# Patient Record
Sex: FEMALE | Race: WHITE | NOT HISPANIC OR LATINO | URBAN - METROPOLITAN AREA
[De-identification: names, ages, dates, MRNs, and addresses within clinical notes are randomized per-mention and may not be internally consistent; named-entity substitution may affect disease eponyms.]

---

## 2020-07-10 ENCOUNTER — TELEPHONE ENCOUNTER (OUTPATIENT)
Dept: URBAN - METROPOLITAN AREA CLINIC 9 | Facility: CLINIC | Age: 35
End: 2020-07-10

## 2020-07-16 ENCOUNTER — TELEPHONE ENCOUNTER (OUTPATIENT)
Dept: URBAN - METROPOLITAN AREA CLINIC 9 | Facility: CLINIC | Age: 35
End: 2020-07-16

## 2020-07-30 ENCOUNTER — OFFICE VISIT (OUTPATIENT)
Dept: URBAN - METROPOLITAN AREA CLINIC 9 | Facility: CLINIC | Age: 35
End: 2020-07-30

## 2020-08-10 ENCOUNTER — LAB OUTSIDE AN ENCOUNTER (OUTPATIENT)
Age: 35
End: 2020-08-10

## 2020-08-10 ENCOUNTER — TELEPHONE ENCOUNTER (OUTPATIENT)
Dept: URBAN - METROPOLITAN AREA CLINIC 9 | Facility: CLINIC | Age: 35
End: 2020-08-10

## 2020-08-27 LAB — CALPROTECTIN, FECAL: (no result)

## 2020-08-31 ENCOUNTER — TELEPHONE ENCOUNTER (OUTPATIENT)
Dept: URBAN - METROPOLITAN AREA CLINIC 9 | Facility: CLINIC | Age: 35
End: 2020-08-31

## 2020-09-14 ENCOUNTER — TELEPHONE ENCOUNTER (OUTPATIENT)
Dept: URBAN - METROPOLITAN AREA CLINIC 9 | Facility: CLINIC | Age: 35
End: 2020-09-14

## 2020-09-24 ENCOUNTER — OFFICE VISIT (OUTPATIENT)
Dept: URBAN - METROPOLITAN AREA SURGERY CENTER 9 | Facility: SURGERY CENTER | Age: 35
End: 2020-09-24

## 2020-10-06 ENCOUNTER — TELEPHONE ENCOUNTER (OUTPATIENT)
Dept: URBAN - METROPOLITAN AREA CLINIC 9 | Facility: CLINIC | Age: 35
End: 2020-10-06

## 2020-10-09 ENCOUNTER — OFFICE VISIT (OUTPATIENT)
Dept: URBAN - METROPOLITAN AREA CLINIC 9 | Facility: CLINIC | Age: 35
End: 2020-10-09

## 2020-11-04 ENCOUNTER — LAB OUTSIDE AN ENCOUNTER (OUTPATIENT)
Age: 35
End: 2020-11-04

## 2020-12-28 ENCOUNTER — TELEPHONE ENCOUNTER (OUTPATIENT)
Dept: URBAN - METROPOLITAN AREA CLINIC 9 | Facility: CLINIC | Age: 35
End: 2020-12-28

## 2021-01-01 ENCOUNTER — OFFICE VISIT (OUTPATIENT)
Age: 36
End: 2021-01-01

## 2021-01-08 ENCOUNTER — TELEPHONE ENCOUNTER (OUTPATIENT)
Dept: URBAN - METROPOLITAN AREA CLINIC 9 | Facility: CLINIC | Age: 36
End: 2021-01-08

## 2021-02-01 ENCOUNTER — OFFICE VISIT (OUTPATIENT)
Age: 36
End: 2021-02-01

## 2021-02-10 ENCOUNTER — OFFICE VISIT (OUTPATIENT)
Dept: URBAN - METROPOLITAN AREA CLINIC 9 | Facility: CLINIC | Age: 36
End: 2021-02-10

## 2021-04-01 ENCOUNTER — OFFICE VISIT (OUTPATIENT)
Age: 36
End: 2021-04-01

## 2021-04-14 ENCOUNTER — TELEPHONE ENCOUNTER (OUTPATIENT)
Dept: URBAN - METROPOLITAN AREA CLINIC 9 | Facility: CLINIC | Age: 36
End: 2021-04-14

## 2021-04-14 ENCOUNTER — OFFICE VISIT (OUTPATIENT)
Dept: URBAN - METROPOLITAN AREA CLINIC 9 | Facility: CLINIC | Age: 36
End: 2021-04-14

## 2021-05-14 ENCOUNTER — OFFICE VISIT (OUTPATIENT)
Dept: URBAN - METROPOLITAN AREA CLINIC 9 | Facility: CLINIC | Age: 36
End: 2021-05-14

## 2021-05-25 ENCOUNTER — TELEPHONE ENCOUNTER (OUTPATIENT)
Dept: URBAN - METROPOLITAN AREA CLINIC 9 | Facility: CLINIC | Age: 36
End: 2021-05-25

## 2021-06-02 ENCOUNTER — TELEPHONE ENCOUNTER (OUTPATIENT)
Dept: URBAN - METROPOLITAN AREA CLINIC 9 | Facility: CLINIC | Age: 36
End: 2021-06-02

## 2021-06-02 ENCOUNTER — OFFICE VISIT (OUTPATIENT)
Dept: URBAN - METROPOLITAN AREA CLINIC 9 | Facility: CLINIC | Age: 36
End: 2021-06-02

## 2021-06-04 ENCOUNTER — OFFICE VISIT (OUTPATIENT)
Dept: URBAN - METROPOLITAN AREA SURGERY CENTER 9 | Facility: SURGERY CENTER | Age: 36
End: 2021-06-04

## 2021-06-07 ENCOUNTER — OFFICE VISIT (OUTPATIENT)
Dept: URBAN - METROPOLITAN AREA SURGERY CENTER 9 | Facility: SURGERY CENTER | Age: 36
End: 2021-06-07

## 2021-06-11 ENCOUNTER — TELEPHONE ENCOUNTER (OUTPATIENT)
Dept: URBAN - METROPOLITAN AREA CLINIC 9 | Facility: CLINIC | Age: 36
End: 2021-06-11

## 2021-06-14 ENCOUNTER — TELEPHONE ENCOUNTER (OUTPATIENT)
Dept: URBAN - METROPOLITAN AREA CLINIC 9 | Facility: CLINIC | Age: 36
End: 2021-06-14

## 2021-06-22 ENCOUNTER — TELEPHONE ENCOUNTER (OUTPATIENT)
Dept: URBAN - METROPOLITAN AREA CLINIC 9 | Facility: CLINIC | Age: 36
End: 2021-06-22

## 2021-06-22 ENCOUNTER — OFFICE VISIT (OUTPATIENT)
Dept: URBAN - METROPOLITAN AREA CLINIC 9 | Facility: CLINIC | Age: 36
End: 2021-06-22

## 2021-06-29 ENCOUNTER — TELEPHONE ENCOUNTER (OUTPATIENT)
Dept: URBAN - METROPOLITAN AREA CLINIC 9 | Facility: CLINIC | Age: 36
End: 2021-06-29

## 2021-07-27 ENCOUNTER — OFFICE VISIT (OUTPATIENT)
Dept: URBAN - METROPOLITAN AREA CLINIC 9 | Facility: CLINIC | Age: 36
End: 2021-07-27

## 2021-07-30 ENCOUNTER — OFFICE VISIT (OUTPATIENT)
Dept: URBAN - METROPOLITAN AREA CLINIC 9 | Facility: CLINIC | Age: 36
End: 2021-07-30

## 2021-09-22 ENCOUNTER — OFFICE VISIT (OUTPATIENT)
Dept: URBAN - METROPOLITAN AREA CLINIC 9 | Facility: CLINIC | Age: 36
End: 2021-09-22

## 2021-10-01 ENCOUNTER — OFFICE VISIT (OUTPATIENT)
Age: 36
End: 2021-10-01

## 2022-01-28 ENCOUNTER — OFFICE VISIT (OUTPATIENT)
Dept: URBAN - METROPOLITAN AREA CLINIC 9 | Facility: CLINIC | Age: 37
End: 2022-01-28

## 2022-02-03 ENCOUNTER — TELEPHONE ENCOUNTER (OUTPATIENT)
Dept: URBAN - METROPOLITAN AREA CLINIC 9 | Facility: CLINIC | Age: 37
End: 2022-02-03

## 2022-02-08 ENCOUNTER — TELEPHONE ENCOUNTER (OUTPATIENT)
Dept: URBAN - METROPOLITAN AREA CLINIC 9 | Facility: CLINIC | Age: 37
End: 2022-02-08

## 2022-02-17 ENCOUNTER — OFFICE VISIT (OUTPATIENT)
Dept: URBAN - METROPOLITAN AREA CLINIC 9 | Facility: CLINIC | Age: 37
End: 2022-02-17

## 2022-02-18 ENCOUNTER — OFFICE VISIT (OUTPATIENT)
Dept: URBAN - METROPOLITAN AREA CLINIC 9 | Facility: CLINIC | Age: 37
End: 2022-02-18

## 2022-03-02 ENCOUNTER — OFFICE VISIT (OUTPATIENT)
Dept: URBAN - METROPOLITAN AREA CLINIC 9 | Facility: CLINIC | Age: 37
End: 2022-03-02

## 2022-04-08 ENCOUNTER — OFFICE VISIT (OUTPATIENT)
Dept: URBAN - METROPOLITAN AREA CLINIC 9 | Facility: CLINIC | Age: 37
End: 2022-04-08

## 2022-04-28 ENCOUNTER — TELEPHONE ENCOUNTER (OUTPATIENT)
Dept: URBAN - METROPOLITAN AREA CLINIC 9 | Facility: CLINIC | Age: 37
End: 2022-04-28

## 2022-06-03 ENCOUNTER — TELEPHONE ENCOUNTER (OUTPATIENT)
Dept: URBAN - METROPOLITAN AREA CLINIC 9 | Facility: CLINIC | Age: 37
End: 2022-06-03

## 2022-06-03 ENCOUNTER — OFFICE VISIT (OUTPATIENT)
Dept: URBAN - METROPOLITAN AREA CLINIC 9 | Facility: CLINIC | Age: 37
End: 2022-06-03

## 2022-07-09 ENCOUNTER — TELEPHONE ENCOUNTER (OUTPATIENT)
Dept: URBAN - METROPOLITAN AREA CLINIC 121 | Facility: CLINIC | Age: 37
End: 2022-07-09

## 2022-07-09 RX ORDER — DROSPIRENONE AND ETHINYL ESTRADIOL 0.03MG-3MG
KIT ORAL ONCE A DAY
Refills: 0 | OUTPATIENT
Start: 2016-02-08 | End: 2019-02-20

## 2022-07-09 RX ORDER — DROSPIRENONE AND ETHINYL ESTRADIOL 0.03MG-3MG
KIT ORAL
Refills: 0 | OUTPATIENT
Start: 2015-12-31 | End: 2016-02-08

## 2022-07-09 RX ORDER — TOPIRAMATE 50 MG/1
TABLET ORAL ONCE A DAY
Refills: 0 | OUTPATIENT
Start: 2016-01-07 | End: 2016-02-08

## 2022-07-09 RX ORDER — SUCRALFATE 1 G/1
TABLET ORAL
Refills: 0 | OUTPATIENT
Start: 2019-02-20 | End: 2019-02-20

## 2022-07-09 RX ORDER — TOPIRAMATE 50 MG/1
TABLET ORAL ONCE A DAY
Refills: 0 | OUTPATIENT
Start: 2016-02-08 | End: 2019-02-20

## 2022-07-09 RX ORDER — FLUTICASONE PROPIONATE 50 UG/1
SPRAY, METERED NASAL
Refills: 0 | OUTPATIENT
Start: 2015-11-16 | End: 2016-02-08

## 2022-07-09 RX ORDER — FLUTICASONE PROPIONATE 50 UG/1
SPRAY, METERED NASAL ONCE A DAY
Refills: 0 | OUTPATIENT
Start: 2016-02-08 | End: 2019-02-20

## 2022-07-09 RX ORDER — OMEPRAZOLE 20 MG/1
ONCE A DAY CAPSULE, DELAYED RELEASE ORAL ONCE A DAY
Refills: 11 | OUTPATIENT
Start: 2016-01-25 | End: 2019-02-20

## 2022-07-10 ENCOUNTER — TELEPHONE ENCOUNTER (OUTPATIENT)
Dept: URBAN - METROPOLITAN AREA CLINIC 121 | Facility: CLINIC | Age: 37
End: 2022-07-10

## 2022-07-10 RX ORDER — ASCORBIC ACID 500 MG
TABLET,CHEWABLE ORAL ONCE A DAY
Refills: 0 | Status: ACTIVE | COMMUNITY
Start: 2019-02-20

## 2022-07-10 RX ORDER — ESOMEPRAZOLE MAGNESIUM 40 MG
CAPSULE,DELAYED RELEASE (ENTERIC COATED) ORAL TWICE A DAY
Refills: 0 | Status: ACTIVE | COMMUNITY
Start: 2019-02-20

## 2022-07-10 RX ORDER — DROSPIRENONE AND ETHINYL ESTRADIOL 0.02-3(28)
KIT ORAL ONCE A DAY
Refills: 0 | Status: ACTIVE | COMMUNITY
Start: 2019-02-20

## 2022-07-10 RX ORDER — AMITRIPTYLINE HYDROCHLORIDE 10 MG/1
TABLET, FILM COATED ORAL ONCE A DAY
Refills: 0 | Status: ACTIVE | COMMUNITY
Start: 2019-02-20

## 2022-07-10 RX ORDER — FERROUS SULFATE 325(65) MG
TABLET ORAL ONCE A DAY
Refills: 0 | Status: ACTIVE | COMMUNITY
Start: 2019-02-20

## 2022-07-30 ENCOUNTER — TELEPHONE ENCOUNTER (OUTPATIENT)
Age: 37
End: 2022-07-30

## 2022-07-30 RX ORDER — PREDNISONE 10 MG/1
4 TABLET ORAL
Qty: 0 | Refills: 2 | OUTPATIENT
Start: 2022-03-02 | End: 2022-04-01

## 2022-07-30 RX ORDER — VEDOLIZUMAB 300 MG/5ML
1 INJECTION, POWDER, LYOPHILIZED, FOR SOLUTION INTRAVENOUS
Qty: 0 | Refills: 2 | OUTPATIENT
Start: 2020-01-16 | End: 2020-02-15

## 2022-07-30 RX ORDER — PREDNISONE 20 MG/1
1 (ONE) TABLET ORAL TWICE A DAY
Qty: 0 | Refills: 2 | OUTPATIENT
Start: 2022-02-08 | End: 2022-02-18

## 2022-07-30 RX ORDER — FOLIC ACID 1 MG/1
1 (ONE) TABLET TABLET ORAL DAILY
Qty: 0 | Refills: 16 | OUTPATIENT
Start: 2019-04-22 | End: 2020-02-26

## 2022-07-30 RX ORDER — WHEAT DEXTRIN 3 G/4 G
1 (ONE) POWDER (GRAM) ORAL DAILY
Qty: 0 | Refills: 2 | OUTPATIENT
Start: 2021-02-10 | End: 2021-03-12

## 2022-07-30 RX ORDER — BUDESONIDE 3 MG/1
1 (ONE) CAPSULE, COATED PELLETS ORAL
Qty: 0 | Refills: 2 | OUTPATIENT
Start: 2019-09-19 | End: 2019-10-01

## 2022-07-30 RX ORDER — PREDNISONE 10 MG/1
2 (TWO) TABLET ORAL AS DIRECTED
Qty: 0 | Refills: 3 | OUTPATIENT
Start: 2019-07-29 | End: 2019-09-10

## 2022-07-30 RX ORDER — ADALIMUMAB 40MG/0.8ML
1 (ONE) KIT SUBCUTANEOUS
Qty: 0 | Refills: 2 | OUTPATIENT
Start: 2019-07-29 | End: 2019-07-29

## 2022-07-30 RX ORDER — VEDOLIZUMAB 300 MG/5ML
300 INJECTION, POWDER, LYOPHILIZED, FOR SOLUTION INTRAVENOUS AS DIRECTED
Qty: 0 | Refills: 2 | OUTPATIENT
Start: 2019-07-29 | End: 2019-08-28

## 2022-07-30 RX ORDER — DICYCLOMINE HYDROCHLORIDE 10 MG/1
1 (ONE) CAPSULE ORAL
Qty: 0 | Refills: 4 | OUTPATIENT
Start: 2019-12-02 | End: 2020-07-30

## 2022-07-30 RX ORDER — CALCIUM CARBONATE 500(1250)
1 TABLET ORAL DAILY
Qty: 0 | Refills: 2 | OUTPATIENT
Start: 2021-05-14 | End: 2021-06-02

## 2022-07-30 RX ORDER — AMOXICILLIN AND CLAVULANATE POTASSIUM 875; 125 MG/1; MG/1
1 TABLET ORAL TWICE A DAY
Qty: 0 | Refills: 2 | OUTPATIENT
Start: 2021-07-30 | End: 2021-08-09

## 2022-07-30 RX ORDER — HYOSCYAMINE SULFATE 0.12 MG/1
1 (ONE) TABLET, ORALLY DISINTEGRATING ORAL
Qty: 0 | Refills: 16 | OUTPATIENT
Start: 2020-01-16 | End: 2020-07-30

## 2022-07-30 RX ORDER — BUDESONIDE 3 MG/1
2 (TWO) CAPSULE, COATED PELLETS ORAL DAILY
Qty: 0 | Refills: 2 | OUTPATIENT
Start: 2020-01-16 | End: 2020-02-15

## 2022-07-30 RX ORDER — ADALIMUMAB 40MG/0.8ML
1 (ONE) KIT SUBCUTANEOUS
Qty: 0 | Refills: 2 | OUTPATIENT
Start: 2019-04-02 | End: 2019-05-02

## 2022-07-30 RX ORDER — VEDOLIZUMAB 300 MG/5ML
1 INJECTION, POWDER, LYOPHILIZED, FOR SOLUTION INTRAVENOUS
Qty: 0 | Refills: 2 | OUTPATIENT
Start: 2020-07-30 | End: 2020-08-29

## 2022-07-30 RX ORDER — PANTOPRAZOLE SODIUM 40 MG/1
1 (ONE) TABLET, DELAYED RELEASE ORAL
Qty: 0 | Refills: 8 | OUTPATIENT
Start: 2020-04-24 | End: 2020-04-24

## 2022-07-30 RX ORDER — PREDNISONE 10 MG/1
1 (ONE) TABLET ORAL AS DIRECTED
Qty: 0 | Refills: 2 | OUTPATIENT
Start: 2019-05-17 | End: 2019-05-20

## 2022-07-30 RX ORDER — WHEAT DEXTRIN 3 G/4 G
1 (ONE) POWDER (GRAM) ORAL DAILY
Qty: 0 | Refills: 2 | OUTPATIENT
Start: 2020-10-09 | End: 2020-11-08

## 2022-07-30 RX ORDER — VEDOLIZUMAB 300 MG/5ML
1 INJECTION, POWDER, LYOPHILIZED, FOR SOLUTION INTRAVENOUS
Qty: 0 | Refills: 2 | OUTPATIENT
Start: 2020-04-24 | End: 2020-05-24

## 2022-07-30 RX ORDER — METRONIDAZOLE 375 MG/1
1 (ONE) CAPSULE ORAL
Qty: 0 | Refills: 2 | OUTPATIENT
Start: 2021-07-30 | End: 2021-08-09

## 2022-07-30 RX ORDER — VEDOLIZUMAB 300 MG/5ML
1 INJECTION, POWDER, LYOPHILIZED, FOR SOLUTION INTRAVENOUS
Qty: 0 | Refills: 2 | OUTPATIENT
Start: 2020-02-26 | End: 2020-03-27

## 2022-07-30 RX ORDER — AMITRIPTYLINE HYDROCHLORIDE 25 MG/1
1 TABLET, FILM COATED ORAL DAILY
Qty: 0 | Refills: 2 | OUTPATIENT
Start: 2019-07-29 | End: 2019-08-28

## 2022-07-31 ENCOUNTER — TELEPHONE ENCOUNTER (OUTPATIENT)
Age: 37
End: 2022-07-31

## 2022-07-31 RX ORDER — PREDNISONE 10 MG/1
4 TABLET ORAL DAILY
Qty: 0 | Refills: 2 | Status: ACTIVE | COMMUNITY
Start: 2022-02-18

## 2022-07-31 RX ORDER — BUDESONIDE 3 MG/1
3 (THREE) CAPSULE, COATED PELLETS ORAL DAILY
Qty: 0 | Refills: 2 | Status: ACTIVE | COMMUNITY
Start: 2019-12-16

## 2022-07-31 RX ORDER — FAMOTIDINE 20 MG/1
1 (ONE) TABLET ORAL EVERY EVENING
Qty: 0 | Refills: 16 | Status: ACTIVE | COMMUNITY
Start: 2022-04-08

## 2022-07-31 RX ORDER — VEDOLIZUMAB 300 MG/5ML
1 INJECTION, POWDER, LYOPHILIZED, FOR SOLUTION INTRAVENOUS AS NEEDED
Qty: 0 | Refills: 2 | Status: ACTIVE | COMMUNITY
Start: 2019-09-19

## 2022-07-31 RX ORDER — PANTOPRAZOLE SODIUM 40 MG/1
1 (ONE) TABLET, DELAYED RELEASE ORAL
Qty: 0 | Refills: 16 | Status: ACTIVE | COMMUNITY
Start: 2022-03-02

## 2022-07-31 RX ORDER — AMOXICILLIN AND CLAVULANATE POTASSIUM 875; 125 MG/1; MG/1
1 TABLET ORAL TWICE A DAY
Qty: 0 | Refills: 2 | Status: ACTIVE | COMMUNITY
Start: 2021-07-27

## 2022-07-31 RX ORDER — PREDNISONE 10 MG/1
4 TABLET ORAL
Qty: 0 | Refills: 2 | Status: ACTIVE | COMMUNITY
Start: 2022-03-02

## 2022-07-31 RX ORDER — ADALIMUMAB 40MG/0.8ML
1 (ONE) KIT SUBCUTANEOUS
Qty: 0 | Refills: 2 | Status: ACTIVE | COMMUNITY
Start: 2019-04-02

## 2022-07-31 RX ORDER — DICYCLOMINE HYDROCHLORIDE 10 MG/1
1 (ONE) CAPSULE ORAL
Qty: 0 | Refills: 4 | Status: ACTIVE | COMMUNITY
Start: 2021-07-27

## 2022-07-31 RX ORDER — DICYCLOMINE HYDROCHLORIDE 10 MG/1
1 (ONE) CAPSULE ORAL
Qty: 0 | Refills: 4 | Status: ACTIVE | COMMUNITY
Start: 2019-09-10

## 2022-07-31 RX ORDER — CALCIUM CARBONATE 500(1250)
1 TABLET ORAL DAILY
Qty: 0 | Refills: 2 | Status: ACTIVE | COMMUNITY
Start: 2020-01-16

## 2022-07-31 RX ORDER — PREDNISONE 20 MG/1
1 (ONE) TABLET ORAL TWICE A DAY
Qty: 0 | Refills: 2 | Status: ACTIVE | COMMUNITY
Start: 2022-02-08

## 2022-07-31 RX ORDER — VEDOLIZUMAB 300 MG/5ML
1 INJECTION, POWDER, LYOPHILIZED, FOR SOLUTION INTRAVENOUS
Qty: 0 | Refills: 2 | Status: ACTIVE | COMMUNITY
Start: 2020-01-16

## 2022-07-31 RX ORDER — PREDNISONE 10 MG/1
1 (ONE) TABLET ORAL AS DIRECTED
Qty: 0 | Refills: 2 | Status: ACTIVE | COMMUNITY
Start: 2019-05-17

## 2022-07-31 RX ORDER — DICYCLOMINE HYDROCHLORIDE 10 MG/1
1 (ONE) CAPSULE ORAL
Qty: 0 | Refills: 4 | Status: ACTIVE | COMMUNITY
Start: 2021-05-14

## 2022-07-31 RX ORDER — VEDOLIZUMAB 300 MG/5ML
INJECTION, POWDER, LYOPHILIZED, FOR SOLUTION INTRAVENOUS
Qty: 0 | Refills: 2 | Status: ACTIVE | COMMUNITY
Start: 2022-04-08

## 2022-07-31 RX ORDER — VEDOLIZUMAB 300 MG/5ML
INJECTION, POWDER, LYOPHILIZED, FOR SOLUTION INTRAVENOUS
Qty: 0 | Refills: 2 | Status: ACTIVE | COMMUNITY
Start: 2022-03-02

## 2022-07-31 RX ORDER — PANTOPRAZOLE SODIUM 40 MG/1
1 (ONE) TABLET DR TABLET, DELAYED RELEASE ORAL
Qty: 0 | Refills: 16 | Status: ACTIVE | COMMUNITY
Start: 2019-11-05

## 2022-07-31 RX ORDER — PANTOPRAZOLE SODIUM 40 MG/1
1 (ONE) TABLET DR TABLET, DELAYED RELEASE ORAL
Qty: 0 | Refills: 16 | Status: ACTIVE | COMMUNITY
Start: 2019-06-27

## 2022-07-31 RX ORDER — DICYCLOMINE HYDROCHLORIDE 10 MG/1
1 (ONE) CAPSULE ORAL
Qty: 0 | Refills: 4 | Status: ACTIVE | COMMUNITY
Start: 2019-11-19

## 2022-07-31 RX ORDER — PANTOPRAZOLE SODIUM 40 MG/1
1 (ONE) TABLET, DELAYED RELEASE ORAL
Qty: 0 | Refills: 16 | Status: ACTIVE | COMMUNITY
Start: 2021-09-22

## 2022-07-31 RX ORDER — CALCIUM CARBONATE 500(1250)
1 TABLET ORAL DAILY
Qty: 0 | Refills: 2 | Status: ACTIVE | COMMUNITY
Start: 2021-05-14

## 2022-07-31 RX ORDER — PREDNISONE 20 MG/1
1 1/2 TABLET ORAL WEEKLY
Qty: 0 | Refills: 2 | Status: ACTIVE | COMMUNITY

## 2022-07-31 RX ORDER — PANTOPRAZOLE SODIUM 40 MG/1
1 (ONE) TABLET, DELAYED RELEASE ORAL
Qty: 0 | Refills: 8 | Status: ACTIVE | COMMUNITY
Start: 2020-03-13

## 2022-07-31 RX ORDER — VEDOLIZUMAB 300 MG/5ML
1 INJECTION, POWDER, LYOPHILIZED, FOR SOLUTION INTRAVENOUS
Qty: 0 | Refills: 2 | Status: ACTIVE | COMMUNITY
Start: 2020-02-26

## 2022-07-31 RX ORDER — PANTOPRAZOLE SODIUM 40 MG/1
1 (ONE) TABLET DR TABLET, DELAYED RELEASE ORAL
Qty: 0 | Refills: 16 | Status: ACTIVE | COMMUNITY
Start: 2020-02-26

## 2022-07-31 RX ORDER — FAMOTIDINE 20 MG/1
1 (ONE) TABLET ORAL EVERY EVENING
Qty: 0 | Refills: 16 | Status: ACTIVE | COMMUNITY
Start: 2022-03-02

## 2022-07-31 RX ORDER — VEDOLIZUMAB 300 MG/5ML
INJECTION, POWDER, LYOPHILIZED, FOR SOLUTION INTRAVENOUS
Qty: 0 | Refills: 2 | Status: ACTIVE | COMMUNITY
Start: 2021-02-10

## 2022-07-31 RX ORDER — FAMOTIDINE 20 MG/1
2 (TWO) TABLET ORAL
Qty: 0 | Refills: 16 | Status: ACTIVE | COMMUNITY
Start: 2021-06-02

## 2022-07-31 RX ORDER — DICYCLOMINE HYDROCHLORIDE 10 MG/1
1 (ONE) CAPSULE ORAL
Qty: 0 | Refills: 4 | Status: ACTIVE | COMMUNITY
Start: 2021-06-02

## 2022-07-31 RX ORDER — BUDESONIDE 3 MG/1
1 (ONE) CAPSULE, COATED PELLETS ORAL
Qty: 0 | Refills: 2 | Status: ACTIVE | COMMUNITY
Start: 2019-09-19

## 2022-07-31 RX ORDER — PANTOPRAZOLE SODIUM 40 MG/1
1 (ONE) TABLET DR TABLET, DELAYED RELEASE ORAL
Qty: 0 | Refills: 16 | Status: ACTIVE | COMMUNITY
Start: 2020-01-16

## 2022-07-31 RX ORDER — CALCIUM CARBONATE 500(1250)
1 TABLET ORAL DAILY
Qty: 0 | Refills: 2 | Status: ACTIVE | COMMUNITY
Start: 2020-10-09

## 2022-07-31 RX ORDER — DICYCLOMINE HYDROCHLORIDE 10 MG/1
1 (ONE) CAPSULE ORAL
Qty: 0 | Refills: 4 | Status: ACTIVE | COMMUNITY
Start: 2021-07-30

## 2022-07-31 RX ORDER — WHEAT DEXTRIN 3 G/4 G
1 (ONE) POWDER (GRAM) ORAL DAILY
Qty: 0 | Refills: 2 | Status: ACTIVE | COMMUNITY
Start: 2020-10-09

## 2022-07-31 RX ORDER — PANTOPRAZOLE SODIUM 40 MG/1
TABLET, DELAYED RELEASE ORAL DAILY
Qty: 0 | Refills: 16 | Status: ACTIVE | COMMUNITY

## 2022-07-31 RX ORDER — FAMOTIDINE 20 MG/1
1 TABLET ORAL
Qty: 0 | Refills: 16 | Status: ACTIVE | COMMUNITY
Start: 2021-02-10

## 2022-07-31 RX ORDER — ONDANSETRON HYDROCHLORIDE 4 MG/1
1 (ONE) TABLET, FILM COATED ORAL
Qty: 0 | Refills: 5 | Status: ACTIVE | COMMUNITY
Start: 2021-06-22

## 2022-07-31 RX ORDER — VEDOLIZUMAB 300 MG/5ML
INJECTION, POWDER, LYOPHILIZED, FOR SOLUTION INTRAVENOUS
Qty: 0 | Refills: 2 | Status: ACTIVE | COMMUNITY
Start: 2021-05-14

## 2022-07-31 RX ORDER — DICYCLOMINE HYDROCHLORIDE 10 MG/1
1 (ONE) CAPSULE ORAL
Qty: 0 | Refills: 4 | Status: ACTIVE | COMMUNITY
Start: 2019-10-16

## 2022-07-31 RX ORDER — BUDESONIDE 3 MG/1
1 (ONE) CAPSULE, COATED PELLETS ORAL
Qty: 0 | Refills: 3 | Status: ACTIVE | COMMUNITY
Start: 2019-09-10

## 2022-07-31 RX ORDER — VEDOLIZUMAB 300 MG/5ML
1 INJECTION, POWDER, LYOPHILIZED, FOR SOLUTION INTRAVENOUS AS NEEDED
Qty: 0 | Refills: 2 | Status: ACTIVE | COMMUNITY
Start: 2019-10-16

## 2022-07-31 RX ORDER — FAMOTIDINE 20 MG/1
2 (TWO) TABLET ORAL
Qty: 0 | Refills: 16 | Status: ACTIVE | COMMUNITY
Start: 2021-07-30

## 2022-07-31 RX ORDER — DICYCLOMINE HYDROCHLORIDE 10 MG/1
1 (ONE) CAPSULE ORAL
Qty: 0 | Refills: 4 | Status: ACTIVE | COMMUNITY
Start: 2019-06-18

## 2022-07-31 RX ORDER — FAMOTIDINE 20 MG/1
1 (ONE) TABLET ORAL EVERY EVENING
Qty: 0 | Refills: 16 | Status: ACTIVE | COMMUNITY
Start: 2022-01-28

## 2022-07-31 RX ORDER — BUDESONIDE 3 MG/1
3 (THREE) CAPSULE, COATED PELLETS ORAL DAILY
Qty: 0 | Refills: 2 | Status: ACTIVE | COMMUNITY
Start: 2019-12-02

## 2022-07-31 RX ORDER — DICYCLOMINE HYDROCHLORIDE 10 MG/1
1 (ONE) CAPSULE ORAL
Qty: 0 | Refills: 4 | Status: ACTIVE | COMMUNITY
Start: 2021-05-25

## 2022-07-31 RX ORDER — BUDESONIDE 3 MG/1
2 (TWO) CAPSULE, COATED PELLETS ORAL DAILY
Qty: 0 | Refills: 2 | Status: ACTIVE | COMMUNITY
Start: 2019-10-16

## 2022-07-31 RX ORDER — BUDESONIDE 3 MG/1
2 (TWO) CAPSULE, COATED PELLETS ORAL DAILY
Qty: 0 | Refills: 2 | Status: ACTIVE | COMMUNITY
Start: 2020-01-16

## 2022-07-31 RX ORDER — FOLIC ACID 1 MG/1
1 (ONE) TABLET ORAL DAILY
Qty: 0 | Refills: 16 | Status: ACTIVE | COMMUNITY
Start: 2019-04-22

## 2022-07-31 RX ORDER — PANTOPRAZOLE SODIUM 40 MG/1
1 (ONE) TABLET, DELAYED RELEASE ORAL
Qty: 0 | Refills: 16 | Status: ACTIVE | COMMUNITY
Start: 2022-01-28

## 2022-07-31 RX ORDER — DICYCLOMINE HYDROCHLORIDE 10 MG/1
1 (ONE) CAPSULE ORAL
Qty: 0 | Refills: 4 | Status: ACTIVE | COMMUNITY
Start: 2019-11-18

## 2022-07-31 RX ORDER — PANTOPRAZOLE SODIUM 40 MG/1
1 (ONE) TABLET, DELAYED RELEASE ORAL
Qty: 0 | Refills: 16 | Status: ACTIVE | COMMUNITY
Start: 2022-04-08

## 2022-07-31 RX ORDER — FAMOTIDINE 20 MG/1
2 (TWO) TABLET ORAL
Qty: 0 | Refills: 16 | Status: ACTIVE | COMMUNITY
Start: 2021-06-22

## 2022-07-31 RX ORDER — FAMOTIDINE 20 MG/1
1 TABLET ORAL
Qty: 0 | Refills: 16 | Status: ACTIVE | COMMUNITY
Start: 2021-04-14

## 2022-07-31 RX ORDER — BUDESONIDE 3 MG/1
2 (TWO) CAPSULE, COATED PELLETS ORAL DAILY
Qty: 0 | Refills: 4 | Status: ACTIVE | COMMUNITY
Start: 2022-04-08

## 2022-07-31 RX ORDER — AMITRIPTYLINE HYDROCHLORIDE 25 MG/1
1 TABLET, FILM COATED ORAL DAILY
Qty: 0 | Refills: 2 | Status: ACTIVE | COMMUNITY
Start: 2019-07-29

## 2022-07-31 RX ORDER — PANTOPRAZOLE SODIUM 40 MG/1
1 (ONE) TABLET, DELAYED RELEASE ORAL
Qty: 0 | Refills: 16 | Status: ACTIVE | COMMUNITY
Start: 2019-03-07

## 2022-07-31 RX ORDER — ADALIMUMAB 40MG/0.8ML
1 (ONE) PEN-INJ KIT KIT SUBCUTANEOUS
Qty: 0 | Refills: 2 | Status: ACTIVE | COMMUNITY
Start: 2019-05-28

## 2022-07-31 RX ORDER — METRONIDAZOLE 375 MG/1
1 (ONE) CAPSULE ORAL
Qty: 0 | Refills: 2 | Status: ACTIVE | COMMUNITY
Start: 2021-07-30

## 2022-07-31 RX ORDER — VEDOLIZUMAB 300 MG/5ML
INJECTION, POWDER, LYOPHILIZED, FOR SOLUTION INTRAVENOUS
Qty: 0 | Refills: 2 | Status: ACTIVE | COMMUNITY
Start: 2021-06-22

## 2022-07-31 RX ORDER — PREDNISONE 10 MG/1
1 TABLET ORAL DAILY
Qty: 0 | Refills: 2 | Status: ACTIVE | COMMUNITY
Start: 2022-04-08

## 2022-07-31 RX ORDER — FAMOTIDINE 20 MG/1
2 (TWO) TABLET ORAL
Qty: 0 | Refills: 16 | Status: ACTIVE | COMMUNITY
Start: 2021-07-27

## 2022-07-31 RX ORDER — DICYCLOMINE HYDROCHLORIDE 10 MG/1
1 (ONE) CAPSULE ORAL
Qty: 0 | Refills: 4 | Status: ACTIVE | COMMUNITY
Start: 2022-04-08

## 2022-07-31 RX ORDER — PANTOPRAZOLE SODIUM 40 MG/1
1 (ONE) TABLET DR TABLET, DELAYED RELEASE ORAL
Qty: 0 | Refills: 16 | Status: ACTIVE | COMMUNITY
Start: 2019-10-16

## 2022-07-31 RX ORDER — VEDOLIZUMAB 300 MG/5ML
1 INJECTION, POWDER, LYOPHILIZED, FOR SOLUTION INTRAVENOUS
Qty: 0 | Refills: 2 | Status: ACTIVE | COMMUNITY
Start: 2020-07-30

## 2022-07-31 RX ORDER — CALCIUM CARBONATE 500(1250)
1 TABLET ORAL DAILY
Qty: 0 | Refills: 2 | Status: ACTIVE | COMMUNITY
Start: 2021-02-10

## 2022-07-31 RX ORDER — PANTOPRAZOLE SODIUM 40 MG/1
1 (ONE) TABLET, DELAYED RELEASE ORAL
Qty: 0 | Refills: 16 | Status: ACTIVE | COMMUNITY
Start: 2022-02-18

## 2022-07-31 RX ORDER — DICYCLOMINE HYDROCHLORIDE 10 MG/1
1 (ONE) CAPSULE ORAL
Qty: 0 | Refills: 4 | Status: ACTIVE | COMMUNITY
Start: 2022-01-28

## 2022-07-31 RX ORDER — AMOXICILLIN AND CLAVULANATE POTASSIUM 875; 125 MG/1; MG/1
1 TABLET ORAL TWICE A DAY
Qty: 0 | Refills: 2 | Status: ACTIVE | COMMUNITY
Start: 2021-07-30

## 2022-07-31 RX ORDER — CALCIUM CARBONATE 500(1250)
1 TABLET ORAL DAILY
Qty: 0 | Refills: 2 | Status: ACTIVE | COMMUNITY
Start: 2019-12-02

## 2022-07-31 RX ORDER — VEDOLIZUMAB 300 MG/5ML
INJECTION, POWDER, LYOPHILIZED, FOR SOLUTION INTRAVENOUS
Qty: 0 | Refills: 2 | Status: ACTIVE | COMMUNITY
Start: 2021-09-22

## 2022-07-31 RX ORDER — VEDOLIZUMAB 300 MG/5ML
INJECTION, POWDER, LYOPHILIZED, FOR SOLUTION INTRAVENOUS
Qty: 0 | Refills: 2 | Status: ACTIVE | COMMUNITY
Start: 2022-01-28

## 2022-07-31 RX ORDER — PANTOPRAZOLE SODIUM 40 MG/1
1 (ONE) TABLET, DELAYED RELEASE ORAL
Qty: 0 | Refills: 16 | Status: ACTIVE | COMMUNITY
Start: 2021-07-30

## 2022-07-31 RX ORDER — ONDANSETRON HYDROCHLORIDE 4 MG/1
1 (ONE) TABLET, FILM COATED ORAL
Qty: 0 | Refills: 5 | Status: ACTIVE | COMMUNITY
Start: 2022-03-02

## 2022-07-31 RX ORDER — DICYCLOMINE HYDROCHLORIDE 10 MG/1
1 (ONE) CAPSULE ORAL
Qty: 0 | Refills: 4 | Status: ACTIVE | COMMUNITY
Start: 2022-03-02

## 2022-07-31 RX ORDER — DICYCLOMINE HYDROCHLORIDE 10 MG/1
1 (ONE) CAPSULE ORAL
Qty: 0 | Refills: 4 | Status: ACTIVE | COMMUNITY
Start: 2021-09-22

## 2022-07-31 RX ORDER — VEDOLIZUMAB 300 MG/5ML
INJECTION, POWDER, LYOPHILIZED, FOR SOLUTION INTRAVENOUS
Qty: 0 | Refills: 2 | Status: ACTIVE | COMMUNITY
Start: 2022-02-18

## 2022-07-31 RX ORDER — DICYCLOMINE HYDROCHLORIDE 10 MG/1
1 (ONE) CAPSULE ORAL
Qty: 0 | Refills: 4 | Status: ACTIVE | COMMUNITY
Start: 2019-07-08

## 2022-07-31 RX ORDER — VEDOLIZUMAB 300 MG/5ML
1 INJECTION, POWDER, LYOPHILIZED, FOR SOLUTION INTRAVENOUS
Qty: 0 | Refills: 2 | Status: ACTIVE | COMMUNITY
Start: 2020-04-24

## 2022-07-31 RX ORDER — FAMOTIDINE 20 MG/1
1 TABLET ORAL
Qty: 0 | Refills: 16 | Status: ACTIVE | COMMUNITY
Start: 2020-04-24

## 2022-07-31 RX ORDER — AMITRIPTYLINE HYDROCHLORIDE 25 MG/1
1 TABLET, FILM COATED ORAL DAILY
Qty: 0 | Refills: 2 | Status: ACTIVE | COMMUNITY
Start: 2019-07-08

## 2022-07-31 RX ORDER — DICYCLOMINE HYDROCHLORIDE 10 MG/1
1 (ONE) CAPSULE ORAL
Qty: 0 | Refills: 4 | Status: ACTIVE | COMMUNITY
Start: 2022-02-18

## 2022-07-31 RX ORDER — CALCIUM CARBONATE 500(1250)
1 TABLET ORAL DAILY
Qty: 0 | Refills: 2 | Status: ACTIVE | COMMUNITY
Start: 2020-04-24

## 2022-07-31 RX ORDER — FAMOTIDINE 20 MG/1
1 (ONE) TABLET ORAL EVERY EVENING
Qty: 0 | Refills: 16 | Status: ACTIVE | COMMUNITY
Start: 2022-02-18

## 2022-07-31 RX ORDER — DICYCLOMINE HYDROCHLORIDE 10 MG/1
1 (ONE) CAPSULE ORAL
Qty: 0 | Refills: 4 | Status: ACTIVE | COMMUNITY
Start: 2021-04-14

## 2022-07-31 RX ORDER — ONDANSETRON HYDROCHLORIDE 4 MG/1
1 (ONE) TABLET, FILM COATED ORAL
Qty: 0 | Refills: 5 | Status: ACTIVE | COMMUNITY
Start: 2022-01-28

## 2022-07-31 RX ORDER — CALCIUM CARBONATE 500(1250)
1 TABLET ORAL DAILY
Qty: 0 | Refills: 2 | Status: ACTIVE | COMMUNITY
Start: 2020-07-30

## 2022-07-31 RX ORDER — DICYCLOMINE HYDROCHLORIDE 10 MG/1
1 (ONE) CAPSULE ORAL
Qty: 0 | Refills: 4 | Status: ACTIVE | COMMUNITY
Start: 2019-11-05

## 2022-07-31 RX ORDER — VEDOLIZUMAB 300 MG/5ML
1 INJECTION, POWDER, LYOPHILIZED, FOR SOLUTION INTRAVENOUS AS NEEDED
Qty: 0 | Refills: 2 | Status: ACTIVE | COMMUNITY
Start: 2019-12-02

## 2022-07-31 RX ORDER — DICYCLOMINE HYDROCHLORIDE 10 MG/1
1 (ONE) CAPSULE ORAL
Qty: 0 | Refills: 4 | Status: ACTIVE | COMMUNITY
Start: 2019-06-27

## 2022-07-31 RX ORDER — FAMOTIDINE 20 MG/1
2 (TWO) TABLET ORAL
Qty: 0 | Refills: 16 | Status: ACTIVE | COMMUNITY
Start: 2021-05-14

## 2022-07-31 RX ORDER — ADALIMUMAB 40MG/0.8ML
1 (ONE) KIT SUBCUTANEOUS
Qty: 0 | Refills: 2 | Status: ACTIVE | COMMUNITY
Start: 2019-06-27

## 2022-07-31 RX ORDER — PREDNISONE 10 MG/1
1 (ONE) TABLET ORAL DAILY
Qty: 0 | Refills: 2 | Status: ACTIVE | COMMUNITY
Start: 2022-02-08

## 2022-07-31 RX ORDER — VEDOLIZUMAB 300 MG/5ML
INJECTION, POWDER, LYOPHILIZED, FOR SOLUTION INTRAVENOUS
Qty: 0 | Refills: 2 | Status: ACTIVE | COMMUNITY
Start: 2021-07-27

## 2022-07-31 RX ORDER — CALCIUM CARBONATE 500(1250)
1 TABLET ORAL DAILY
Qty: 0 | Refills: 2 | Status: ACTIVE | COMMUNITY
Start: 2020-02-26

## 2022-07-31 RX ORDER — BUDESONIDE 3 MG/1
2 (TWO) CAPSULE, COATED PELLETS ORAL DAILY
Qty: 0 | Refills: 2 | Status: ACTIVE | COMMUNITY
Start: 2019-11-05

## 2022-07-31 RX ORDER — PANTOPRAZOLE SODIUM 40 MG/1
1 (ONE) TABLET DR TABLET, DELAYED RELEASE ORAL
Qty: 0 | Refills: 16 | Status: ACTIVE | COMMUNITY
Start: 2019-07-29

## 2022-07-31 RX ORDER — CALCIUM CARBONATE 500(1250)
1 TABLET ORAL DAILY
Qty: 0 | Refills: 2 | Status: ACTIVE | COMMUNITY
Start: 2021-04-14

## 2022-07-31 RX ORDER — FAMOTIDINE 20 MG/1
1 TABLET ORAL
Qty: 0 | Refills: 16 | Status: ACTIVE | COMMUNITY
Start: 2020-10-09

## 2022-07-31 RX ORDER — BUDESONIDE 3 MG/1
3 (THREE) CAPSULE, COATED PELLETS ORAL DAILY
Qty: 0 | Refills: 2 | Status: ACTIVE | COMMUNITY
Start: 2020-01-03

## 2022-07-31 RX ORDER — VEDOLIZUMAB 300 MG/5ML
INJECTION, POWDER, LYOPHILIZED, FOR SOLUTION INTRAVENOUS
Qty: 0 | Refills: 2 | Status: ACTIVE | COMMUNITY
Start: 2021-06-02

## 2022-07-31 RX ORDER — VEDOLIZUMAB 300 MG/5ML
300 INJECTION, POWDER, LYOPHILIZED, FOR SOLUTION INTRAVENOUS AS DIRECTED
Qty: 0 | Refills: 2 | Status: ACTIVE | COMMUNITY
Start: 2019-07-29

## 2022-07-31 RX ORDER — DICYCLOMINE HYDROCHLORIDE 10 MG/1
1 (ONE) CAPSULE ORAL
Qty: 0 | Refills: 4 | Status: ACTIVE | COMMUNITY
Start: 2019-12-02

## 2022-07-31 RX ORDER — DICYCLOMINE HYDROCHLORIDE 10 MG/1
1 (ONE) CAPSULE ORAL
Qty: 0 | Refills: 4 | Status: ACTIVE | COMMUNITY
Start: 2021-02-10

## 2022-07-31 RX ORDER — VEDOLIZUMAB 300 MG/5ML
INJECTION, POWDER, LYOPHILIZED, FOR SOLUTION INTRAVENOUS
Qty: 0 | Refills: 2 | Status: ACTIVE | COMMUNITY
Start: 2021-07-30

## 2022-07-31 RX ORDER — FAMOTIDINE 20 MG/1
1 TABLET ORAL
Qty: 0 | Refills: 16 | Status: ACTIVE | COMMUNITY
Start: 2020-07-30

## 2022-07-31 RX ORDER — FAMOTIDINE 20 MG/1
1 (ONE) TABLET ORAL EVERY EVENING
Qty: 0 | Refills: 16 | Status: ACTIVE | COMMUNITY
Start: 2021-09-22

## 2022-07-31 RX ORDER — ONDANSETRON HYDROCHLORIDE 4 MG/1
1 (ONE) TABLET, FILM COATED ORAL
Qty: 0 | Refills: 5 | Status: ACTIVE | COMMUNITY
Start: 2022-02-18

## 2022-07-31 RX ORDER — VEDOLIZUMAB 300 MG/5ML
1 INJECTION, POWDER, LYOPHILIZED, FOR SOLUTION INTRAVENOUS AS NEEDED
Qty: 0 | Refills: 2 | Status: ACTIVE | COMMUNITY
Start: 2019-11-05

## 2022-07-31 RX ORDER — BUDESONIDE 3 MG/1
3 (THREE) CAPSULE, COATED PELLETS ORAL DAILY
Qty: 0 | Refills: 2 | Status: ACTIVE | COMMUNITY
Start: 2019-11-25

## 2022-07-31 RX ORDER — PANTOPRAZOLE SODIUM 40 MG/1
1 (ONE) TABLET DR TABLET, DELAYED RELEASE ORAL
Qty: 0 | Refills: 16 | Status: ACTIVE | COMMUNITY
Start: 2019-12-02

## 2022-07-31 RX ORDER — WHEAT DEXTRIN 3 G/4 G
1 (ONE) POWDER (GRAM) ORAL DAILY
Qty: 0 | Refills: 2 | Status: ACTIVE | COMMUNITY
Start: 2021-02-10

## 2022-07-31 RX ORDER — HYOSCYAMINE SULFATE 0.12 MG/1
1 (ONE) TABLET, ORALLY DISINTEGRATING ORAL
Qty: 0 | Refills: 16 | Status: ACTIVE | COMMUNITY
Start: 2020-01-16

## 2022-07-31 RX ORDER — METRONIDAZOLE 375 MG/1
1 (ONE) CAPSULE ORAL
Qty: 0 | Refills: 2 | Status: ACTIVE | COMMUNITY
Start: 2021-07-27

## 2022-07-31 RX ORDER — PANTOPRAZOLE SODIUM 40 MG/1
1 (ONE) TABLET DR TABLET, DELAYED RELEASE ORAL
Qty: 0 | Refills: 16 | Status: ACTIVE | COMMUNITY
Start: 2019-09-10

## 2022-07-31 RX ORDER — ONDANSETRON HYDROCHLORIDE 4 MG/1
1 (ONE) TABLET, FILM COATED ORAL
Qty: 0 | Refills: 5 | Status: ACTIVE | COMMUNITY
Start: 2021-07-30

## 2022-07-31 RX ORDER — PREDNISONE 10 MG/1
2 (TWO) TABLET ORAL AS DIRECTED
Qty: 0 | Refills: 3 | Status: ACTIVE | COMMUNITY
Start: 2019-07-29

## 2022-07-31 RX ORDER — PANTOPRAZOLE SODIUM 40 MG/1
1 (ONE) TABLET DR TABLET, DELAYED RELEASE ORAL
Qty: 0 | Refills: 16 | Status: ACTIVE | COMMUNITY
Start: 2019-05-17

## 2022-08-19 ENCOUNTER — TELEPHONE ENCOUNTER (OUTPATIENT)
Dept: URBAN - METROPOLITAN AREA CLINIC 7 | Facility: CLINIC | Age: 37
End: 2022-08-19

## 2022-08-19 ENCOUNTER — OFFICE VISIT (OUTPATIENT)
Dept: URBAN - METROPOLITAN AREA CLINIC 9 | Facility: CLINIC | Age: 37
End: 2022-08-19
Payer: COMMERCIAL

## 2022-08-19 VITALS
HEIGHT: 64 IN | SYSTOLIC BLOOD PRESSURE: 116 MMHG | BODY MASS INDEX: 29.02 KG/M2 | WEIGHT: 170 LBS | DIASTOLIC BLOOD PRESSURE: 78 MMHG

## 2022-08-19 DIAGNOSIS — K21.9 GASTROESOPHAGEAL REFLUX DISEASE, ESOPHAGITIS PRESENCE NOT SPECIFIED: ICD-10-CM

## 2022-08-19 DIAGNOSIS — K82.8 BILIARY DYSKINESIA: ICD-10-CM

## 2022-08-19 DIAGNOSIS — K58.0 IRRITABLE BOWEL SYNDROME WITH DIARRHEA: ICD-10-CM

## 2022-08-19 DIAGNOSIS — K50.80 CROHN'S DISEASE OF BOTH SMALL AND LARGE INTESTINE WITHOUT COMPLICATION: ICD-10-CM

## 2022-08-19 PROCEDURE — 99214 OFFICE O/P EST MOD 30 MIN: CPT | Performed by: INTERNAL MEDICINE

## 2022-08-19 RX ORDER — HYOSCYAMINE SULFATE 0.12 MG/1
1 (ONE) TABLET, ORALLY DISINTEGRATING ORAL
Qty: 0 | Refills: 16 | Status: DISCONTINUED | COMMUNITY
Start: 2020-01-16

## 2022-08-19 RX ORDER — FOLIC ACID 1 MG/1
1 (ONE) TABLET ORAL DAILY
Qty: 0 | Refills: 16 | Status: DISCONTINUED | COMMUNITY
Start: 2019-04-22

## 2022-08-19 RX ORDER — BUDESONIDE 3 MG/1
2 (TWO) CAPSULE, COATED PELLETS ORAL DAILY
Qty: 0 | Refills: 2 | Status: DISCONTINUED | COMMUNITY
Start: 2019-11-05

## 2022-08-19 RX ORDER — ESOMEPRAZOLE MAGNESIUM 40 MG
CAPSULE,DELAYED RELEASE (ENTERIC COATED) ORAL TWICE A DAY
Refills: 0 | Status: DISCONTINUED | COMMUNITY
Start: 2019-02-20

## 2022-08-19 RX ORDER — VEDOLIZUMAB 300 MG/5ML
INJECTION, POWDER, LYOPHILIZED, FOR SOLUTION INTRAVENOUS
OUTPATIENT
Start: 2021-07-27

## 2022-08-19 RX ORDER — DICYCLOMINE HYDROCHLORIDE 10 MG/1
1 (ONE) CAPSULE ORAL
Refills: 4 | Status: ACTIVE | COMMUNITY
Start: 2019-11-05

## 2022-08-19 RX ORDER — ADALIMUMAB 40MG/0.8ML
1 (ONE) KIT SUBCUTANEOUS
Qty: 0 | Refills: 2 | Status: DISCONTINUED | COMMUNITY
Start: 2019-04-02

## 2022-08-19 RX ORDER — ONDANSETRON HYDROCHLORIDE 4 MG/1
1 (ONE) TABLET, FILM COATED ORAL
Qty: 0 | Refills: 5 | Status: ACTIVE | COMMUNITY
Start: 2022-01-28

## 2022-08-19 RX ORDER — PANTOPRAZOLE SODIUM 40 MG/1
1 (ONE) TABLET DR TABLET, DELAYED RELEASE ORAL
OUTPATIENT
Start: 2019-05-17

## 2022-08-19 RX ORDER — DROSPIRENONE AND ETHINYL ESTRADIOL 0.02-3(28)
KIT ORAL ONCE A DAY
Refills: 0 | Status: ACTIVE | COMMUNITY
Start: 2019-02-20

## 2022-08-19 RX ORDER — PREDNISONE 10 MG/1
1 (ONE) TABLET ORAL AS DIRECTED
Qty: 0 | Refills: 2 | Status: DISCONTINUED | COMMUNITY
Start: 2019-05-17

## 2022-08-19 RX ORDER — FAMOTIDINE 20 MG/1
1 TABLET ORAL EVERY EVENING
Qty: 30 | Refills: 11 | OUTPATIENT
Start: 2021-07-27

## 2022-08-19 RX ORDER — FAMOTIDINE 20 MG/1
1 TABLET ORAL
Refills: 16 | Status: ACTIVE | COMMUNITY
Start: 2021-07-27

## 2022-08-19 RX ORDER — VEDOLIZUMAB 300 MG/5ML
INJECTION, POWDER, LYOPHILIZED, FOR SOLUTION INTRAVENOUS
Qty: 0 | Refills: 2 | Status: ACTIVE | COMMUNITY
Start: 2021-07-27

## 2022-08-19 RX ORDER — METRONIDAZOLE 500 MG/1
1 TABLET TABLET ORAL THREE TIMES A DAY
Qty: 30 TABLETS | Refills: 0 | OUTPATIENT
Start: 2022-08-19 | End: 2022-08-29

## 2022-08-19 RX ORDER — PANTOPRAZOLE SODIUM 40 MG/1
1 (ONE) TABLET DR TABLET, DELAYED RELEASE ORAL
Qty: 0 | Refills: 16 | Status: ACTIVE | COMMUNITY
Start: 2019-05-17

## 2022-08-19 RX ORDER — ASCORBIC ACID 500 MG
TABLET,CHEWABLE ORAL ONCE A DAY
Refills: 0 | Status: DISCONTINUED | COMMUNITY
Start: 2019-02-20

## 2022-08-19 RX ORDER — METRONIDAZOLE 375 MG/1
1 (ONE) CAPSULE ORAL
Qty: 0 | Refills: 2 | Status: DISCONTINUED | COMMUNITY
Start: 2021-07-30

## 2022-08-19 RX ORDER — AMITRIPTYLINE HYDROCHLORIDE 25 MG/1
1 TABLET, FILM COATED ORAL DAILY
Qty: 0 | Refills: 2 | Status: DISCONTINUED | COMMUNITY
Start: 2019-07-29

## 2022-08-19 RX ORDER — WHEAT DEXTRIN 3 G/4 G
1 (ONE) POWDER (GRAM) ORAL DAILY
Qty: 0 | Refills: 2 | Status: DISCONTINUED | COMMUNITY
Start: 2020-10-09

## 2022-08-19 RX ORDER — AMITRIPTYLINE HYDROCHLORIDE 10 MG/1
TABLET, FILM COATED ORAL ONCE A DAY
Refills: 0 | Status: ACTIVE | COMMUNITY
Start: 2019-02-20

## 2022-08-19 RX ORDER — SERTRALINE 100 MG/1
1 TABLET TABLET, FILM COATED ORAL ONCE A DAY
Qty: 30 | Status: ACTIVE | COMMUNITY
Start: 2022-08-19

## 2022-08-19 RX ORDER — CALCIUM CARBONATE 500(1250)
1 TABLET ORAL DAILY
Qty: 0 | Refills: 2 | Status: DISCONTINUED | COMMUNITY
Start: 2021-05-14

## 2022-08-19 RX ORDER — PREDNISONE 20 MG/1
1 (ONE) TABLET ORAL TWICE A DAY
Qty: 0 | Refills: 2 | Status: DISCONTINUED | COMMUNITY
Start: 2022-02-08

## 2022-08-19 RX ORDER — AMOXICILLIN AND CLAVULANATE POTASSIUM 875; 125 MG/1; MG/1
1 TABLET ORAL TWICE A DAY
Qty: 0 | Refills: 2 | Status: DISCONTINUED | COMMUNITY
Start: 2021-07-30

## 2022-08-19 RX ORDER — FERROUS SULFATE 325(65) MG
TABLET ORAL ONCE A DAY
Refills: 0 | Status: DISCONTINUED | COMMUNITY
Start: 2019-02-20

## 2022-08-19 RX ORDER — DICYCLOMINE HYDROCHLORIDE 10 MG/1
1 (ONE) CAPSULE ORAL
OUTPATIENT
Start: 2019-11-05

## 2022-08-19 NOTE — HPI-TODAY'S VISIT:
Here for f/u of ileocolonic Crohn's disease  Hx/o ileocolonic CD dx in 2019 hx/o fatty liver hx/o Fe def anemia  2/2020 s/p appy and right moises with dramatic improvement in symptoms 6/2021 s/p ccx with resolution of upper abd pain, GB did have stones and adhesions  EGD 2016 Dr. Macias with PUD EGD 3/2019 negative, bx with chronic gastritis and duodenitis 2019 Colonoscopy with TI and colon bx with ileocolonic Crohn's disease 2020 Colon with no active crohn's EGD 2021 with bx neg for zavala's, celiac or h pylori  11/2019 CT a/p with active uncomplicated ileitis MRE 8/2020 with no active small bowel inflammation US 4/2021 neg for GB pathology PPIDA 6/2021 with delayed EF 2022 MRE with mild active ileitis  Fecal calpro 9/2020 57 2020 Fecal elastase over 500 5/2021 Fecal calprotectin normal 2022 fecal calpro nl at 31  TPMT enzyme activity in the normal range TB spot negative Hepatitis B is negative, pt s/p vaccination  SIBO not complete but suggestive of negative  Prior tx: Failed humira On entyvio level slightly low at 12 Q8 weeks Entyvio at 20 in 5/2021 without antibodies Entyvio 2022 16 without ab  2/2022: Labs neg Stool studies neg Entyvio appropriatej  She was on steroids but didn't feel better and now has stopped. Her last colon was in 2020 and she has ongoing issues with abd pain so I advised that we should plan on repeat colonoscopy with bx. I also advised that her entyvio is appropriately dosed so if she has active disease may need a different therapy.  I also will plan a cdif and 10 days of flagyl for her IBD. RTC 6 weeks.

## 2022-08-26 ENCOUNTER — TELEPHONE ENCOUNTER (OUTPATIENT)
Dept: URBAN - METROPOLITAN AREA CLINIC 7 | Facility: CLINIC | Age: 37
End: 2022-08-26

## 2022-08-26 RX ORDER — PANTOPRAZOLE SODIUM 40 MG/1
1 (ONE) TABLET DR TABLET, DELAYED RELEASE ORAL
Qty: 30 | Refills: 6
Start: 2019-05-17

## 2022-08-29 ENCOUNTER — TELEPHONE ENCOUNTER (OUTPATIENT)
Dept: URBAN - METROPOLITAN AREA CLINIC 7 | Facility: CLINIC | Age: 37
End: 2022-08-29

## 2022-08-29 RX ORDER — PANTOPRAZOLE SODIUM 40 MG/1
1 (ONE) TABLET DR TABLET, DELAYED RELEASE ORAL
Qty: 90 | Refills: 3

## 2022-09-22 LAB
CLOSTRIDIUM DIFFICILE TOXINB,QL REAL TIME PCR: (no result)
COMMENT: (no result)
QUESTION/PROBLEM:: (no result)
SPECIMEN(S) RECEIVED:: (no result)

## 2022-11-15 ENCOUNTER — WEB ENCOUNTER (OUTPATIENT)
Dept: URBAN - METROPOLITAN AREA SURGERY CENTER 9 | Facility: SURGERY CENTER | Age: 37
End: 2022-11-15

## 2022-11-18 ENCOUNTER — OFFICE VISIT (OUTPATIENT)
Dept: URBAN - METROPOLITAN AREA SURGERY CENTER 9 | Facility: SURGERY CENTER | Age: 37
End: 2022-11-18
Payer: COMMERCIAL

## 2022-11-18 DIAGNOSIS — Z87.19 ESOPHAGEAL FOOD BOLUS: ICD-10-CM

## 2022-11-18 DIAGNOSIS — K64.0 BLEEDING GRADE I HEMORRHOIDS: ICD-10-CM

## 2022-11-18 DIAGNOSIS — K63.89 BACTERIAL OVERGROWTH SYNDROME: ICD-10-CM

## 2022-11-18 PROCEDURE — 45380 COLONOSCOPY AND BIOPSY: CPT | Performed by: INTERNAL MEDICINE

## 2022-11-18 RX ORDER — SERTRALINE 100 MG/1
1 TABLET TABLET, FILM COATED ORAL ONCE A DAY
Qty: 30 | Status: ACTIVE | COMMUNITY
Start: 2022-08-19

## 2022-11-18 RX ORDER — FAMOTIDINE 20 MG/1
1 TABLET ORAL EVERY EVENING
Qty: 30 | Refills: 11 | Status: ACTIVE | COMMUNITY
Start: 2021-07-27

## 2022-11-18 RX ORDER — ONDANSETRON HYDROCHLORIDE 4 MG/1
1 (ONE) TABLET, FILM COATED ORAL
Qty: 0 | Refills: 5 | Status: ACTIVE | COMMUNITY
Start: 2022-01-28

## 2022-11-18 RX ORDER — AMITRIPTYLINE HYDROCHLORIDE 10 MG/1
TABLET, FILM COATED ORAL ONCE A DAY
Refills: 0 | Status: ACTIVE | COMMUNITY
Start: 2019-02-20

## 2022-11-18 RX ORDER — PANTOPRAZOLE SODIUM 40 MG/1
1 (ONE) TABLET DR TABLET, DELAYED RELEASE ORAL
Qty: 90 | Refills: 3 | Status: ACTIVE | COMMUNITY

## 2022-11-18 RX ORDER — VEDOLIZUMAB 300 MG/5ML
INJECTION, POWDER, LYOPHILIZED, FOR SOLUTION INTRAVENOUS
Status: ACTIVE | COMMUNITY
Start: 2021-07-27

## 2022-11-18 RX ORDER — DROSPIRENONE AND ETHINYL ESTRADIOL 0.02-3(28)
KIT ORAL ONCE A DAY
Refills: 0 | Status: ACTIVE | COMMUNITY
Start: 2019-02-20

## 2022-11-18 RX ORDER — DICYCLOMINE HYDROCHLORIDE 10 MG/1
1 (ONE) CAPSULE ORAL
Status: ACTIVE | COMMUNITY
Start: 2019-11-05

## 2022-11-29 ENCOUNTER — TELEPHONE ENCOUNTER (OUTPATIENT)
Dept: URBAN - METROPOLITAN AREA CLINIC 7 | Facility: CLINIC | Age: 37
End: 2022-11-29

## 2022-11-29 RX ORDER — RIFAXIMIN 550 MG/1
1 TABLET TABLET ORAL THREE TIMES A DAY
Qty: 42 TABLET | Refills: 1 | OUTPATIENT
Start: 2022-11-29 | End: 2022-12-27

## 2022-12-01 ENCOUNTER — TELEPHONE ENCOUNTER (OUTPATIENT)
Dept: URBAN - METROPOLITAN AREA CLINIC 7 | Facility: CLINIC | Age: 37
End: 2022-12-01

## 2022-12-09 ENCOUNTER — TELEPHONE ENCOUNTER (OUTPATIENT)
Dept: URBAN - METROPOLITAN AREA CLINIC 7 | Facility: CLINIC | Age: 37
End: 2022-12-09

## 2022-12-23 ENCOUNTER — OFFICE VISIT (OUTPATIENT)
Dept: URBAN - METROPOLITAN AREA CLINIC 9 | Facility: CLINIC | Age: 37
End: 2022-12-23
Payer: COMMERCIAL

## 2022-12-23 VITALS
SYSTOLIC BLOOD PRESSURE: 122 MMHG | HEIGHT: 64 IN | WEIGHT: 169 LBS | DIASTOLIC BLOOD PRESSURE: 80 MMHG | BODY MASS INDEX: 28.85 KG/M2

## 2022-12-23 DIAGNOSIS — K58.0 IRRITABLE BOWEL SYNDROME WITH DIARRHEA: ICD-10-CM

## 2022-12-23 DIAGNOSIS — K82.8 BILIARY DYSKINESIA: ICD-10-CM

## 2022-12-23 DIAGNOSIS — K21.9 GASTROESOPHAGEAL REFLUX DISEASE, ESOPHAGITIS PRESENCE NOT SPECIFIED: ICD-10-CM

## 2022-12-23 DIAGNOSIS — K50.80 CROHN'S DISEASE OF BOTH SMALL AND LARGE INTESTINE WITHOUT COMPLICATION: ICD-10-CM

## 2022-12-23 PROCEDURE — 99214 OFFICE O/P EST MOD 30 MIN: CPT | Performed by: INTERNAL MEDICINE

## 2022-12-23 RX ORDER — DICYCLOMINE HYDROCHLORIDE 10 MG/1
1 (ONE) CAPSULE ORAL
Status: ON HOLD | COMMUNITY
Start: 2019-11-05

## 2022-12-23 RX ORDER — PANTOPRAZOLE SODIUM 40 MG/1
1 (ONE) TABLET DR TABLET, DELAYED RELEASE ORAL
OUTPATIENT

## 2022-12-23 RX ORDER — DROSPIRENONE AND ETHINYL ESTRADIOL 0.02-3(28)
KIT ORAL ONCE A DAY
Refills: 0 | Status: ACTIVE | COMMUNITY
Start: 2019-02-20

## 2022-12-23 RX ORDER — FAMOTIDINE 20 MG/1
1 TABLET ORAL EVERY EVENING
OUTPATIENT
Start: 2021-07-27

## 2022-12-23 RX ORDER — RIFAXIMIN 550 MG/1
1 TABLET TABLET ORAL THREE TIMES A DAY
Qty: 42 TABLET | Refills: 1 | Status: ACTIVE | COMMUNITY
Start: 2022-11-29 | End: 2022-12-27

## 2022-12-23 RX ORDER — ONDANSETRON HYDROCHLORIDE 4 MG/1
1 (ONE) TABLET, FILM COATED ORAL
Qty: 0 | Refills: 5 | Status: ACTIVE | COMMUNITY
Start: 2022-01-28

## 2022-12-23 RX ORDER — PANTOPRAZOLE SODIUM 40 MG/1
1 (ONE) TABLET DR TABLET, DELAYED RELEASE ORAL
Qty: 90 | Refills: 3 | Status: ON HOLD | COMMUNITY

## 2022-12-23 RX ORDER — VEDOLIZUMAB 300 MG/5ML
INJECTION, POWDER, LYOPHILIZED, FOR SOLUTION INTRAVENOUS
OUTPATIENT
Start: 2021-07-27

## 2022-12-23 RX ORDER — VEDOLIZUMAB 300 MG/5ML
INJECTION, POWDER, LYOPHILIZED, FOR SOLUTION INTRAVENOUS
Status: ACTIVE | COMMUNITY
Start: 2021-07-27

## 2022-12-23 RX ORDER — RIFAXIMIN 550 MG/1
1 TABLET TABLET ORAL THREE TIMES A DAY
OUTPATIENT
Start: 2022-11-29

## 2022-12-23 RX ORDER — FAMOTIDINE 20 MG/1
1 TABLET ORAL EVERY EVENING
Qty: 30 | Refills: 11 | Status: ACTIVE | COMMUNITY
Start: 2021-07-27

## 2022-12-23 RX ORDER — AMITRIPTYLINE HYDROCHLORIDE 10 MG/1
TABLET, FILM COATED ORAL ONCE A DAY
Refills: 0 | Status: ACTIVE | COMMUNITY
Start: 2019-02-20

## 2022-12-23 RX ORDER — SERTRALINE 100 MG/1
1 TABLET TABLET, FILM COATED ORAL ONCE A DAY
Qty: 30 | Status: ACTIVE | COMMUNITY
Start: 2022-08-19

## 2022-12-23 RX ORDER — DICYCLOMINE HYDROCHLORIDE 10 MG/1
1 (ONE) CAPSULE ORAL
OUTPATIENT
Start: 2019-11-05

## 2022-12-23 NOTE — HPI-TODAY'S VISIT:
Here for f/u of ileocolonic Crohn's disease . Hx/o ileocolonic CD dx in 2019 hx/o fatty liver hx/o Fe def anemia . 2/2020 s/p appy and right moises with dramatic improvement in symptoms 6/2021 s/p ccx with resolution of upper abd pain, GB did have stones and adhesions . EGD 2016 Dr. Macias with PUD EGD 3/2019 negative, bx with chronic gastritis and duodenitis 2019 Colonoscopy with TI and colon bx with ileocolonic Crohn's disease 2020 Colon with no active crohn's EGD 2021 with bx neg for zavala's, celiac or h pylori 2022 colon with no active colitis . 11/2019 CT a/p with active uncomplicated ileitis MRE 8/2020 with no active small bowel inflammation US 4/2021 neg for GB pathology PPIDA 6/2021 with delayed EF 2022 MRE with mild active ileitis . Fecal calpro 9/2020 57 2020 Fecal elastase over 500 5/2021 Fecal calprotectin normal 2022 fecal calpro nl at 31 . TPMT enzyme activity in the normal range TB spot negative Hepatitis B is negative, pt s/p vaccination . SIBO not complete but suggestive of negative . Prior tx: Failed humira On entyvio level slightly low at 12 Q8 weeks Entyvio at 20 in 5/2021 without antibodies Entyvio 2022 16 without ab . 2/2022: Labs neg Stool studies neg Entyvio appropriate . Pt now here for f/u. She now has just started the xifaxan yesterdasy for what appears to be IBS d as her colitis seems to be in remission. Her main sx are urgency but given she is in colitis will see if sx improve with xifaxan. .

## 2022-12-30 LAB
ASSAY FOR CALPROTECTIN FECAL: (no result)
C-REACTIVE PROTEIN: (no result)
Lab: (no result)

## 2023-01-20 ENCOUNTER — WEB ENCOUNTER (OUTPATIENT)
Dept: URBAN - METROPOLITAN AREA CLINIC 9 | Facility: CLINIC | Age: 38
End: 2023-01-20

## 2023-01-31 ENCOUNTER — TELEPHONE ENCOUNTER (OUTPATIENT)
Dept: URBAN - METROPOLITAN AREA CLINIC 9 | Facility: CLINIC | Age: 38
End: 2023-01-31

## 2023-03-09 ENCOUNTER — OFFICE VISIT (OUTPATIENT)
Dept: URBAN - METROPOLITAN AREA CLINIC 9 | Facility: CLINIC | Age: 38
End: 2023-03-09
Payer: COMMERCIAL

## 2023-03-09 VITALS
DIASTOLIC BLOOD PRESSURE: 82 MMHG | WEIGHT: 168 LBS | BODY MASS INDEX: 28.68 KG/M2 | SYSTOLIC BLOOD PRESSURE: 124 MMHG | HEIGHT: 64 IN

## 2023-03-09 DIAGNOSIS — K82.8 BILIARY DYSKINESIA: ICD-10-CM

## 2023-03-09 DIAGNOSIS — K58.0 IRRITABLE BOWEL SYNDROME WITH DIARRHEA: ICD-10-CM

## 2023-03-09 DIAGNOSIS — K50.80 CROHN'S DISEASE OF BOTH SMALL AND LARGE INTESTINE WITHOUT COMPLICATION: ICD-10-CM

## 2023-03-09 DIAGNOSIS — K21.9 GASTROESOPHAGEAL REFLUX DISEASE, ESOPHAGITIS PRESENCE NOT SPECIFIED: ICD-10-CM

## 2023-03-09 PROCEDURE — 99213 OFFICE O/P EST LOW 20 MIN: CPT | Performed by: INTERNAL MEDICINE

## 2023-03-09 RX ORDER — AMITRIPTYLINE HYDROCHLORIDE 10 MG/1
10-25 MG TABLET, FILM COATED ORAL EVERY NIGHT
Qty: 30 | Refills: 3 | OUTPATIENT
Start: 2019-02-20

## 2023-03-09 RX ORDER — AMITRIPTYLINE HYDROCHLORIDE 10 MG/1
TABLET, FILM COATED ORAL ONCE A DAY
Refills: 0 | Status: ACTIVE | COMMUNITY
Start: 2019-02-20

## 2023-03-09 RX ORDER — VEDOLIZUMAB 300 MG/5ML
INJECTION, POWDER, LYOPHILIZED, FOR SOLUTION INTRAVENOUS
Status: ACTIVE | COMMUNITY
Start: 2021-07-27

## 2023-03-09 RX ORDER — VEDOLIZUMAB 300 MG/5ML
INJECTION, POWDER, LYOPHILIZED, FOR SOLUTION INTRAVENOUS
OUTPATIENT
Start: 2021-07-27

## 2023-03-09 RX ORDER — SERTRALINE HYDROCHLORIDE 150 MG/1
1 CAPSULE CAPSULE ORAL ONCE A DAY
Status: ACTIVE | COMMUNITY

## 2023-03-09 RX ORDER — SERTRALINE 100 MG/1
1 TABLET TABLET, FILM COATED ORAL ONCE A DAY
Qty: 30 | Status: DISCONTINUED | COMMUNITY
Start: 2022-08-19

## 2023-03-09 RX ORDER — PANTOPRAZOLE SODIUM 40 MG/1
1 (ONE) TABLET DR TABLET, DELAYED RELEASE ORAL
Status: DISCONTINUED | COMMUNITY

## 2023-03-09 RX ORDER — RIFAXIMIN 550 MG/1
1 TABLET TABLET ORAL THREE TIMES A DAY
Status: DISCONTINUED | COMMUNITY
Start: 2022-11-29

## 2023-03-09 RX ORDER — ONDANSETRON HYDROCHLORIDE 4 MG/1
1 (ONE) TABLET, FILM COATED ORAL
Qty: 0 | Refills: 5 | Status: ACTIVE | COMMUNITY
Start: 2022-01-28

## 2023-03-09 RX ORDER — DICYCLOMINE HYDROCHLORIDE 10 MG/1
1 (ONE) CAPSULE ORAL
Status: DISCONTINUED | COMMUNITY
Start: 2019-11-05

## 2023-03-09 RX ORDER — FAMOTIDINE 20 MG/1
1 TABLET ORAL EVERY EVENING
Status: DISCONTINUED | COMMUNITY
Start: 2021-07-27

## 2023-03-09 RX ORDER — DROSPIRENONE AND ETHINYL ESTRADIOL 0.02-3(28)
KIT ORAL ONCE A DAY
Refills: 0 | Status: ACTIVE | COMMUNITY
Start: 2019-02-20

## 2023-03-09 RX ORDER — NALTREXONE HYDROCHLORIDE AND BUPROPION HYDROCHLORIDE 8; 90 MG/1; MG/1
2 TABLETS TABLET, EXTENDED RELEASE ORAL TWICE A DAY
Status: ACTIVE | COMMUNITY

## 2023-03-09 NOTE — HPI-TODAY'S VISIT:
Here for f/u of ileocolonic Crohn's disease and IBS . Hx/o ileocolonic CD dx in 2019 hx/o fatty liver hx/o Fe def anemia . 2/2020 s/p appy and right moises with dramatic improvement in symptoms 6/2021 s/p ccx with resolution of upper abd pain, GB did have stones and adhesions . EGD 2016 Dr. Macias with PUD EGD 3/2019 negative, bx with chronic gastritis and duodenitis 2019 Colonoscopy with TI and colon bx with ileocolonic Crohn's disease 2020 Colon with no active crohn's EGD 2021 with bx neg for zavala's, celiac or h pylori 2022 colon with no active colitis . 11/2019 CT a/p with active uncomplicated ileitis MRE 8/2020 with no active small bowel inflammation US 4/2021 neg for GB pathology PPIDA 6/2021 with delayed EF 2022 MRE with mild active ileitis . Fecal calpro 9/2020 57 2020 Fecal elastase over 500 5/2021 Fecal calprotectin normal 2022 fecal calpro nl at 31 . TPMT enzyme activity in the normal range TB spot negative Hepatitis B is negative, pt s/p vaccination . SIBO not complete but suggestive of negative S/p xifaxan 2023 with much improvement in symptoms . Prior tx: Failed humira On entyvio level slightly low at 12 Q8 weeks Entyvio at 20 in 5/2021 without antibodies Entyvio 2022 16 without ab . 2/2022: Labs neg Stool studies neg Entyvio appropriate . Pt is about 50% improved with the xifaxan. She is still on entyvio and overall is feeling well and has had complete resolution of her abd pain.  .

## 2023-03-10 ENCOUNTER — OFFICE VISIT (OUTPATIENT)
Dept: URBAN - METROPOLITAN AREA CLINIC 9 | Facility: CLINIC | Age: 38
End: 2023-03-10

## 2023-04-19 ENCOUNTER — WEB ENCOUNTER (OUTPATIENT)
Dept: URBAN - METROPOLITAN AREA CLINIC 9 | Facility: CLINIC | Age: 38
End: 2023-04-19

## 2023-05-17 ENCOUNTER — WEB ENCOUNTER (OUTPATIENT)
Dept: URBAN - METROPOLITAN AREA CLINIC 9 | Facility: CLINIC | Age: 38
End: 2023-05-17

## 2023-05-17 RX ORDER — RIFAXIMIN 550 MG/1
1 TABLET TABLET ORAL THREE TIMES A DAY
Qty: 42 TABLET | Refills: 1 | OUTPATIENT
Start: 2023-05-18 | End: 2023-06-15

## 2023-05-24 ENCOUNTER — TELEPHONE ENCOUNTER (OUTPATIENT)
Dept: URBAN - METROPOLITAN AREA CLINIC 7 | Facility: CLINIC | Age: 38
End: 2023-05-24

## 2023-06-26 ENCOUNTER — TELEPHONE ENCOUNTER (OUTPATIENT)
Dept: URBAN - METROPOLITAN AREA CLINIC 7 | Facility: CLINIC | Age: 38
End: 2023-06-26

## 2023-06-26 RX ORDER — VEDOLIZUMAB 300 MG/5ML
INJECTION, POWDER, LYOPHILIZED, FOR SOLUTION INTRAVENOUS
Start: 2021-07-27

## 2023-06-29 ENCOUNTER — TELEPHONE ENCOUNTER (OUTPATIENT)
Dept: URBAN - METROPOLITAN AREA CLINIC 7 | Facility: CLINIC | Age: 38
End: 2023-06-29

## 2023-06-29 RX ORDER — VEDOLIZUMAB 300 MG/5ML
AS DIRECTED INJECTION, POWDER, LYOPHILIZED, FOR SOLUTION INTRAVENOUS
Qty: 1 | Refills: 2
Start: 2021-07-27 | End: 2023-12-13

## 2023-09-08 ENCOUNTER — OFFICE VISIT (OUTPATIENT)
Dept: URBAN - METROPOLITAN AREA CLINIC 9 | Facility: CLINIC | Age: 38
End: 2023-09-08
Payer: COMMERCIAL

## 2023-09-08 ENCOUNTER — WEB ENCOUNTER (OUTPATIENT)
Dept: URBAN - METROPOLITAN AREA CLINIC 9 | Facility: CLINIC | Age: 38
End: 2023-09-08

## 2023-09-08 VITALS
WEIGHT: 150 LBS | HEIGHT: 64 IN | SYSTOLIC BLOOD PRESSURE: 126 MMHG | DIASTOLIC BLOOD PRESSURE: 92 MMHG | BODY MASS INDEX: 25.61 KG/M2

## 2023-09-08 DIAGNOSIS — K58.0 IRRITABLE BOWEL SYNDROME WITH DIARRHEA: ICD-10-CM

## 2023-09-08 DIAGNOSIS — K50.80 CROHN'S DISEASE OF BOTH SMALL AND LARGE INTESTINE WITHOUT COMPLICATION: ICD-10-CM

## 2023-09-08 PROCEDURE — 99214 OFFICE O/P EST MOD 30 MIN: CPT | Performed by: INTERNAL MEDICINE

## 2023-09-08 RX ORDER — SERTRALINE HYDROCHLORIDE 150 MG/1
1 CAPSULE CAPSULE ORAL ONCE A DAY
Status: ACTIVE | COMMUNITY

## 2023-09-08 RX ORDER — DROSPIRENONE AND ETHINYL ESTRADIOL 0.02-3(28)
KIT ORAL ONCE A DAY
Refills: 0 | Status: ACTIVE | COMMUNITY
Start: 2019-02-20

## 2023-09-08 RX ORDER — ONDANSETRON HYDROCHLORIDE 4 MG/1
1 (ONE) TABLET, FILM COATED ORAL
Qty: 0 | Refills: 5 | Status: ACTIVE | COMMUNITY
Start: 2022-01-28

## 2023-09-08 RX ORDER — AMITRIPTYLINE HYDROCHLORIDE 10 MG/1
10-25 MG TABLET, FILM COATED ORAL EVERY NIGHT
OUTPATIENT
Start: 2019-02-20

## 2023-09-08 RX ORDER — VEDOLIZUMAB 300 MG/5ML
AS DIRECTED INJECTION, POWDER, LYOPHILIZED, FOR SOLUTION INTRAVENOUS
OUTPATIENT
Start: 2021-07-27

## 2023-09-08 RX ORDER — CYCLOBENZAPRINE HYDROCHLORIDE 10 MG/1
1 TABLET AT BEDTIME AS NEEDED TABLET, FILM COATED ORAL ONCE A DAY
Status: ACTIVE | COMMUNITY

## 2023-09-08 RX ORDER — VEDOLIZUMAB 300 MG/5ML
AS DIRECTED INJECTION, POWDER, LYOPHILIZED, FOR SOLUTION INTRAVENOUS
Qty: 1 | Refills: 2 | Status: ACTIVE | COMMUNITY
Start: 2021-07-27 | End: 2023-12-13

## 2023-09-08 RX ORDER — DICYCLOMINE HYDROCHLORIDE 10 MG/1
1 (ONE) CAPSULE ORAL
OUTPATIENT
Start: 2019-11-05

## 2023-09-08 RX ORDER — AMITRIPTYLINE HYDROCHLORIDE 10 MG/1
10-25 MG TABLET, FILM COATED ORAL EVERY NIGHT
Qty: 30 | Refills: 3 | Status: ACTIVE | COMMUNITY
Start: 2019-02-20

## 2023-09-08 RX ORDER — NALTREXONE HYDROCHLORIDE AND BUPROPION HYDROCHLORIDE 8; 90 MG/1; MG/1
2 TABLETS TABLET, EXTENDED RELEASE ORAL TWICE A DAY
Status: ACTIVE | COMMUNITY

## 2023-09-08 NOTE — PHYSICAL EXAM EYES:
Sclerae : White without injection , Conjuntivae and eyelids appear normal History of Present Illness





- Reason for Consult


end stage renal disease





- History of Present Illness





patient is a 56-year-old female with history of end-stage renal disease on 

hemodialysis on a  schedule.


Patient has a history of seizures and CVA.  She has underlying dementia as well.

 Patient presented to the hospital after a seizure post dialysis.  She had been 

postictal.  Her needles were still in the access when she came in.


Patient was dialyzed yesterday.


Patient has been evaluated by neurology.  Meds have been adjusted several times 

before.





It appears that sometimes patient has had trouble maintaining outpatient regimen

with issues regarding availability of medications as well.





Review of Systems





as per HPI, other systems negative





Past Medical History


Past Medical History: Chest Pain / Angina, CVA/TIA, Diabetes Mellitus, 

GERD/Reflux, Hyperlipidemia, Hypertension, Memory Impairment, Osteoarthritis 

(OA), Renal Disease, Seizure Disorder, Thyroid Disorder


Additional Past Medical History / Comment(s): Last seizure approximately one 

month ago. Spouse states she used to have seizures 4X per week until she had 

vagal nerve stimulator placed, now has seizures approximately once a week to 

once a month. Dialysis MOWEFR. Neuropathy, left drop foot, only able to walk 

short distances, otherwise uses wheelchair. Hx CVAs and TIAs, starting 12 yrs 

ago, with residual memory impairment. Never diagnosed with Sleep Apnea but 

spouse states she does stop breathing through the night and he has to shake her 

to start breathing again. Varicose veins.


History of Any Multi-Drug Resistant Organisms: None Reported


Past Surgical History:  Section, Tonsillectomy, Uterine Ablation


Additional Past Surgical History / Comment(s): Left arm fistula, loop recorder, 

vagus nerve stimulator.


Past Anesthesia/Blood Transfusion Reactions: Motion Sickness


Additional Past Anesthesia/Blood Transfusion Reaction / Comment(s): Family hx 

unknown, pt adopted.


Past Psychological History: Bipolar


Smoking Status: Former smoker


Past Alcohol Use History: None Reported


Additional Past Alcohol Use History / Comment(s): Quit smoking in .


Past Drug Use History: None Reported





- Past Family History


  ** Father


Family Medical History: Unable to Obtain


Additional Family Medical History / Comment(s): Pt adopted.





Medications and Allergies


                                Home Medications











 Medication  Instructions  Recorded  Confirmed  Type


 


Clopidogrel [Plavix] 75 mg PO DAILY #30 tab 21 Rx


 


amLODIPine [Norvasc] 10 mg PO DAILY #30 tab 21 Rx


 


Atorvastatin [Lipitor] 40 mg PO DAILY 21 History


 


Dulaglutide [Trulicity] 3 mg SQ Q7D 21 History


 


Lacosamide [Vimpat] 150 mg PO AS DIRECTED 21 History


 


Lacosamide [Vimpat] 150 mg PO AS DIRECTED 21 History


 


Sevelamer [Renvela] 800 - 1,600 mg PO AS DIRECTED 21 History


 


Vascepa 1gm 1 gm PO BID 21 History


 


Calcium Acetate 2,001 mg PO TID-W/MEALS 22 History


 


Metoprolol Tartrate [Lopressor] 25 mg PO BID 22 History


 


Pantoprazole [Protonix] 40 mg PO DAILY 22 History


 


Potassium Chloride ER [K-Dur 20] 20 meq PO BID 22 History


 


levETIRAcetam [Keppra] 500 mg PO BID 22 History








                                    Allergies











Allergy/AdvReac Type Severity Reaction Status Date / Time


 


Penicillins Allergy  Itching Verified 22 14:55














Physical Exam


Vitals: 


                                   Vital Signs











  Temp Pulse Resp BP Pulse Ox


 


 22 08:00  98.4 F  81   149/73  100


 


 22 02:00  98.2 F  76  17  133/46  100


 


 22 20:00   76  17  


 


 22 17:52  99 F  78  20  146/73 


 


 22 14:00  98.5 F  76   120/78  100








                                Intake and Output











 22





 22:59 06:59 14:59


 


Intake Total 300  


 


Output Total 800  


 


Balance -500  


 


Intake:   


 


  Hemodialysis 300  


 


Output:   


 


  Hemodialysis 800  


 


Other:   


 


  # Voids 1 2 


 


  # Bowel Movements 1  














awake, comfortable, in no acute distress


Examination of the heart S1 and S2


Examination lungs bilateral breath sounds are heard


Abdomen is soft nontender


Examination of lower extremities shows no evidence of edema





CNS exam grossly intact





Results





- Lab Results


                             Most recent lab results











Calcium  8.9 mg/dL (8.7-10.3)   22  08:08    


 


Magnesium  1.9 mg/dL (1.6-2.3)   22  17:03    














                                 22 08:08





                                 22 08:08





Assessment and Plan


Assessment: 





1.  End-stage renal disease on hemodialysis on a  

schedule


2.  History of CVA


3.  Seizure disorder with breakthrough seizures being followed by neurology


4. CK D mineral bone disorder


Plan: 





hemodialysis in a.m.


Antiseizure medications as per neurology


DC potassium as potassium was high

## 2023-09-08 NOTE — HPI-TODAY'S VISIT:
Here for f/u of ileocolonic Crohn's disease and IBS . Hx/o ileocolonic CD dx in 2019 hx/o fatty liver hx/o Fe def anemia . 2/2020 s/p appy and right moises with dramatic improvement in symptoms 6/2021 s/p ccx with resolution of upper abd pain, GB did have stones and adhesions . EGD 2016 Dr. Macias with PUD EGD 3/2019 negative, bx with chronic gastritis and duodenitis 2019 Colonoscopy with TI and colon bx with ileocolonic Crohn's disease 2020 Colon with no active crohn's EGD 2021 with bx neg for zavala's, celiac or h pylori 2022 colon with no active colitis . 11/2019 CT a/p with active uncomplicated ileitis MRE 8/2020 with no active small bowel inflammation US 4/2021 neg for GB pathology PPIDA 6/2021 with delayed EF 2022 MRE with mild active ileitis . Fecal calpro 9/2020 57 2020 Fecal elastase over 500 5/2021 Fecal calprotectin normal 2022 fecal calpro nl at 31 . TPMT enzyme activity in the normal range TB spot negative Hepatitis B is negative, pt s/p vaccination . SIBO not complete but suggestive of negative S/p xifaxan 2023 with much improvement in symptoms . Prior tx: Failed humira On entyvio level slightly low at 12 Q8 weeks Entyvio at 20 in 5/2021 without antibodies Entyvio 2022 16 without ab . 2/2022: Labs neg Stool studies neg Entyvio appropriate . I reviewed CBC, BMp, lfts from 8/2023 which were unrevealing. She has not had cross sectional imaging since 2022 so will plan on CT enterography as her 2022 imaging had mild ileitis. She is agreeable. She is on entyvio with no issues. Her fecal calpro was neg and entyvio therapeutic so hold the course on CD treatment pending her CTE is neg. She was dx with endometriosis vs PCOS that is undergoing work up and that likely explains several of her ongoing lower abd symptoms despite her CD being in remission. RTC 2 months. .

## 2023-09-19 ENCOUNTER — TELEPHONE ENCOUNTER (OUTPATIENT)
Dept: URBAN - METROPOLITAN AREA CLINIC 63 | Facility: CLINIC | Age: 38
End: 2023-09-19

## 2023-09-28 ENCOUNTER — TELEPHONE ENCOUNTER (OUTPATIENT)
Dept: URBAN - METROPOLITAN AREA CLINIC 7 | Facility: CLINIC | Age: 38
End: 2023-09-28

## 2023-10-06 ENCOUNTER — CLAIMS CREATED FROM THE CLAIM WINDOW (OUTPATIENT)
Dept: URBAN - METROPOLITAN AREA SURGERY CENTER 9 | Facility: SURGERY CENTER | Age: 38
End: 2023-10-06
Payer: COMMERCIAL

## 2023-10-06 ENCOUNTER — CLAIMS CREATED FROM THE CLAIM WINDOW (OUTPATIENT)
Dept: URBAN - METROPOLITAN AREA CLINIC 4 | Facility: CLINIC | Age: 38
End: 2023-10-06
Payer: COMMERCIAL

## 2023-10-06 DIAGNOSIS — Z98.0 INTESTINAL BYPASS AND ANASTOMOSIS STATUS: ICD-10-CM

## 2023-10-06 DIAGNOSIS — R93.3 ABNORMAL FINDINGS ON DIAGNOSTIC IMAGING OF OTHER PARTS OF DIGESTIVE TRACT: ICD-10-CM

## 2023-10-06 DIAGNOSIS — Z98.0 INTESTINAL ANASTOMOSIS PRESENT: ICD-10-CM

## 2023-10-06 DIAGNOSIS — R93.3 ABNORMAL FINDING ON DIAGNOSTIC IMAGING OF GI TRACT: ICD-10-CM

## 2023-10-06 DIAGNOSIS — K64.1 SECOND DEGREE HEMORRHOIDS: ICD-10-CM

## 2023-10-06 DIAGNOSIS — K63.89 OTHER SPECIFIED DISEASES OF INTESTINE: ICD-10-CM

## 2023-10-06 PROCEDURE — 00811 ANES LWR INTST NDSC NOS: CPT | Performed by: NURSE ANESTHETIST, CERTIFIED REGISTERED

## 2023-10-06 PROCEDURE — 88305 TISSUE EXAM BY PATHOLOGIST: CPT | Performed by: PATHOLOGY

## 2023-10-06 PROCEDURE — 45380 COLONOSCOPY AND BIOPSY: CPT | Performed by: INTERNAL MEDICINE

## 2023-10-06 RX ORDER — VEDOLIZUMAB 300 MG/5ML
AS DIRECTED INJECTION, POWDER, LYOPHILIZED, FOR SOLUTION INTRAVENOUS
Status: ACTIVE | COMMUNITY
Start: 2021-07-27

## 2023-10-06 RX ORDER — SERTRALINE HYDROCHLORIDE 150 MG/1
1 CAPSULE CAPSULE ORAL ONCE A DAY
Status: ACTIVE | COMMUNITY

## 2023-10-06 RX ORDER — DICYCLOMINE HYDROCHLORIDE 10 MG/1
1 (ONE) CAPSULE ORAL
Status: ACTIVE | COMMUNITY
Start: 2019-11-05

## 2023-10-06 RX ORDER — NALTREXONE HYDROCHLORIDE AND BUPROPION HYDROCHLORIDE 8; 90 MG/1; MG/1
2 TABLETS TABLET, EXTENDED RELEASE ORAL TWICE A DAY
Status: ACTIVE | COMMUNITY

## 2023-10-06 RX ORDER — CYCLOBENZAPRINE HYDROCHLORIDE 10 MG/1
1 TABLET AT BEDTIME AS NEEDED TABLET, FILM COATED ORAL ONCE A DAY
Status: ACTIVE | COMMUNITY

## 2023-10-06 RX ORDER — AMITRIPTYLINE HYDROCHLORIDE 10 MG/1
10-25 MG TABLET, FILM COATED ORAL EVERY NIGHT
Status: ACTIVE | COMMUNITY
Start: 2019-02-20

## 2023-10-06 RX ORDER — DROSPIRENONE AND ETHINYL ESTRADIOL 0.02-3(28)
KIT ORAL ONCE A DAY
Refills: 0 | Status: ACTIVE | COMMUNITY
Start: 2019-02-20

## 2023-10-06 RX ORDER — ONDANSETRON HYDROCHLORIDE 4 MG/1
1 (ONE) TABLET, FILM COATED ORAL
Qty: 0 | Refills: 5 | Status: ACTIVE | COMMUNITY
Start: 2022-01-28

## 2023-11-15 ENCOUNTER — WEB ENCOUNTER (OUTPATIENT)
Dept: URBAN - METROPOLITAN AREA CLINIC 9 | Facility: CLINIC | Age: 38
End: 2023-11-15

## 2023-11-17 ENCOUNTER — OFFICE VISIT (OUTPATIENT)
Dept: URBAN - METROPOLITAN AREA CLINIC 9 | Facility: CLINIC | Age: 38
End: 2023-11-17
Payer: COMMERCIAL

## 2023-11-17 VITALS
SYSTOLIC BLOOD PRESSURE: 118 MMHG | DIASTOLIC BLOOD PRESSURE: 72 MMHG | HEIGHT: 64 IN | BODY MASS INDEX: 25.27 KG/M2 | WEIGHT: 148 LBS

## 2023-11-17 DIAGNOSIS — R10.13 DYSPEPSIA: ICD-10-CM

## 2023-11-17 DIAGNOSIS — K50.80 CROHN'S DISEASE OF BOTH SMALL AND LARGE INTESTINE WITHOUT COMPLICATION: ICD-10-CM

## 2023-11-17 DIAGNOSIS — K58.0 IRRITABLE BOWEL SYNDROME WITH DIARRHEA: ICD-10-CM

## 2023-11-17 PROCEDURE — 99214 OFFICE O/P EST MOD 30 MIN: CPT | Performed by: INTERNAL MEDICINE

## 2023-11-17 RX ORDER — CYCLOBENZAPRINE HYDROCHLORIDE 10 MG/1
1 TABLET AT BEDTIME AS NEEDED TABLET, FILM COATED ORAL ONCE A DAY
Status: ACTIVE | COMMUNITY

## 2023-11-17 RX ORDER — DROSPIRENONE AND ETHINYL ESTRADIOL 0.02-3(28)
KIT ORAL ONCE A DAY
Refills: 0 | Status: ACTIVE | COMMUNITY
Start: 2019-02-20

## 2023-11-17 RX ORDER — ONDANSETRON HYDROCHLORIDE 4 MG/1
1 (ONE) TABLET, FILM COATED ORAL
Qty: 0 | Refills: 5 | Status: ACTIVE | COMMUNITY
Start: 2022-01-28

## 2023-11-17 RX ORDER — AMITRIPTYLINE HYDROCHLORIDE 10 MG/1
25MG TABLET, FILM COATED ORAL EVERY NIGHT
Qty: 30 | Refills: 3 | OUTPATIENT
Start: 2019-02-20

## 2023-11-17 RX ORDER — DICYCLOMINE HYDROCHLORIDE 10 MG/1
1 (ONE) CAPSULE ORAL
OUTPATIENT
Start: 2019-11-05

## 2023-11-17 RX ORDER — AMITRIPTYLINE HYDROCHLORIDE 10 MG/1
10-25 MG TABLET, FILM COATED ORAL EVERY NIGHT
Status: ACTIVE | COMMUNITY
Start: 2019-02-20

## 2023-11-17 RX ORDER — DICYCLOMINE HYDROCHLORIDE 10 MG/1
1 (ONE) CAPSULE ORAL
Status: ON HOLD | COMMUNITY
Start: 2019-11-05

## 2023-11-17 RX ORDER — VEDOLIZUMAB 300 MG/5ML
AS DIRECTED INJECTION, POWDER, LYOPHILIZED, FOR SOLUTION INTRAVENOUS
OUTPATIENT
Start: 2021-07-27

## 2023-11-17 RX ORDER — VEDOLIZUMAB 300 MG/5ML
AS DIRECTED INJECTION, POWDER, LYOPHILIZED, FOR SOLUTION INTRAVENOUS
Status: ACTIVE | COMMUNITY
Start: 2021-07-27

## 2023-11-17 RX ORDER — WHEAT DEXTRIN 3 G/3.5 G
AS DIRECTED POWDER (GRAM) ORAL
OUTPATIENT
Start: 2023-11-17

## 2023-11-17 RX ORDER — OMEPRAZOLE 40 MG/1
1 CAPSULE 30 MINUTES BEFORE MORNING MEAL CAPSULE, DELAYED RELEASE ORAL ONCE A DAY
Qty: 30 | OUTPATIENT
Start: 2023-11-17

## 2023-11-17 RX ORDER — SERTRALINE HYDROCHLORIDE 150 MG/1
1 CAPSULE CAPSULE ORAL ONCE A DAY
Status: ACTIVE | COMMUNITY

## 2023-11-17 RX ORDER — NALTREXONE HYDROCHLORIDE AND BUPROPION HYDROCHLORIDE 8; 90 MG/1; MG/1
2 TABLETS TABLET, EXTENDED RELEASE ORAL TWICE A DAY
Status: ACTIVE | COMMUNITY

## 2023-11-17 NOTE — HPI-TODAY'S VISIT:
Here for f/u of ileocolonic Crohn's disease and IBS . Hx/o ileocolonic CD dx in 2019 hx/o fatty liver hx/o Fe def anemia . 2/2020 s/p appy and right moises with dramatic improvement in symptoms 6/2021 s/p ccx with resolution of upper abd pain, GB did have stones and adhesions . EGD 2016 Dr. Macias with PUD EGD 3/2019 negative, bx with chronic gastritis and duodenitis 2019 Colonoscopy with TI and colon bx with ileocolonic Crohn's disease 2020 Colon with no active crohn's EGD 2021 with bx neg for zavala's, celiac or h pylori 2022 colon with no active colitis 2023 Colon with bx neg for colitis . 11/2019 CT a/p with active uncomplicated ileitis MRE 8/2020 with no active small bowel inflammation US 4/2021 neg for GB pathology PPIDA 6/2021 with delayed EF 2022 MRE with mild active ileitis 2023 CTE with minimal inflammatory changes . Fecal calpro 9/2020 57 2020 Fecal elastase over 500 5/2021 Fecal calprotectin normal 2022 fecal calpro nl at 31 . TPMT enzyme activity in the normal range TB spot negative Hepatitis B is negative, pt s/p vaccination . SIBO not complete but suggestive of negative S/p xifaxan 2023 with much improvement in symptoms . Prior tx: Failed humira On entyvio level slightly low at 12 Q8 weeks Entyvio at 20 in 5/2021 without antibodies Entyvio 2022 16 without ab . 2/2022: Labs neg Stool studies neg Entyvio appropriate . She is here today for worsened upper abd pain. She still has bowel urgency. her w/u was neg for active crohn's suggesting she is in remission. I suspect this is dyspspesia and IBS. Will restart fiber, increase elavil to 25 to 50 mg, restart bentyl, restart PPI and RTC in 4 weeks. If still symptomatic will do another course of xifaxan.  .

## 2023-11-21 ENCOUNTER — WEB ENCOUNTER (OUTPATIENT)
Dept: URBAN - METROPOLITAN AREA CLINIC 9 | Facility: CLINIC | Age: 38
End: 2023-11-21

## 2023-12-03 ENCOUNTER — WEB ENCOUNTER (OUTPATIENT)
Dept: URBAN - METROPOLITAN AREA CLINIC 9 | Facility: CLINIC | Age: 38
End: 2023-12-03

## 2023-12-03 RX ORDER — DICYCLOMINE HYDROCHLORIDE 10 MG/1
1 (ONE) CAPSULE ORAL
Qty: 120 | Refills: 0
Start: 2019-11-05 | End: 2024-01-03

## 2023-12-28 ENCOUNTER — OFFICE VISIT (OUTPATIENT)
Dept: URBAN - METROPOLITAN AREA CLINIC 9 | Facility: CLINIC | Age: 38
End: 2023-12-28

## 2023-12-28 ENCOUNTER — WEB ENCOUNTER (OUTPATIENT)
Dept: URBAN - METROPOLITAN AREA CLINIC 9 | Facility: CLINIC | Age: 38
End: 2023-12-28

## 2024-01-19 ENCOUNTER — DASHBOARD ENCOUNTERS (OUTPATIENT)
Age: 39
End: 2024-01-19

## 2024-01-19 ENCOUNTER — OFFICE VISIT (OUTPATIENT)
Dept: URBAN - METROPOLITAN AREA CLINIC 9 | Facility: CLINIC | Age: 39
End: 2024-01-19
Payer: COMMERCIAL

## 2024-01-19 VITALS
SYSTOLIC BLOOD PRESSURE: 122 MMHG | WEIGHT: 150 LBS | BODY MASS INDEX: 25.61 KG/M2 | DIASTOLIC BLOOD PRESSURE: 82 MMHG | HEIGHT: 64 IN

## 2024-01-19 DIAGNOSIS — R10.13 DYSPEPSIA: ICD-10-CM

## 2024-01-19 DIAGNOSIS — K58.0 IRRITABLE BOWEL SYNDROME WITH DIARRHEA: ICD-10-CM

## 2024-01-19 DIAGNOSIS — K50.80 CROHN'S DISEASE OF BOTH SMALL AND LARGE INTESTINE WITHOUT COMPLICATION: ICD-10-CM

## 2024-01-19 PROCEDURE — 99214 OFFICE O/P EST MOD 30 MIN: CPT | Performed by: INTERNAL MEDICINE

## 2024-01-19 RX ORDER — DICYCLOMINE HYDROCHLORIDE 10 MG/1
CAPSULE ORAL
OUTPATIENT

## 2024-01-19 RX ORDER — OMEPRAZOLE 40 MG/1
1 CAPSULE 30 MINUTES BEFORE MORNING MEAL CAPSULE, DELAYED RELEASE ORAL ONCE A DAY
OUTPATIENT
Start: 2023-11-17

## 2024-01-19 RX ORDER — WHEAT DEXTRIN 3 G/3.5 G
AS DIRECTED POWDER (GRAM) ORAL
OUTPATIENT
Start: 2023-11-17

## 2024-01-19 RX ORDER — DROSPIRENONE AND ETHINYL ESTRADIOL 0.02-3(28)
KIT ORAL ONCE A DAY
Refills: 0 | Status: ACTIVE | COMMUNITY
Start: 2019-02-20

## 2024-01-19 RX ORDER — WHEAT DEXTRIN 3 G/3.5 G
AS DIRECTED POWDER (GRAM) ORAL
Status: ACTIVE | COMMUNITY
Start: 2023-11-17

## 2024-01-19 RX ORDER — NALTREXONE HYDROCHLORIDE AND BUPROPION HYDROCHLORIDE 8; 90 MG/1; MG/1
2 TABLETS TABLET, EXTENDED RELEASE ORAL TWICE A DAY
Status: ON HOLD | COMMUNITY

## 2024-01-19 RX ORDER — AMITRIPTYLINE HYDROCHLORIDE 10 MG/1
25MG TABLET, FILM COATED ORAL EVERY NIGHT
Qty: 30 | Refills: 3 | Status: ACTIVE | COMMUNITY
Start: 2019-02-20

## 2024-01-19 RX ORDER — CYCLOBENZAPRINE HYDROCHLORIDE 10 MG/1
1 TABLET AT BEDTIME AS NEEDED TABLET, FILM COATED ORAL ONCE A DAY
Status: ACTIVE | COMMUNITY

## 2024-01-19 RX ORDER — RIFAXIMIN 550 MG/1
1 TABLET TABLET ORAL
Refills: 0 | OUTPATIENT
Start: 2024-01-19 | End: 2024-02-02

## 2024-01-19 RX ORDER — ONDANSETRON HYDROCHLORIDE 4 MG/1
1 (ONE) TABLET, FILM COATED ORAL
Qty: 0 | Refills: 5 | Status: ACTIVE | COMMUNITY
Start: 2022-01-28

## 2024-01-19 RX ORDER — VEDOLIZUMAB 300 MG/5ML
AS DIRECTED INJECTION, POWDER, LYOPHILIZED, FOR SOLUTION INTRAVENOUS
Status: ACTIVE | COMMUNITY
Start: 2021-07-27

## 2024-01-19 RX ORDER — DICYCLOMINE HYDROCHLORIDE 10 MG/1
CAPSULE ORAL
Qty: 120 CAPSULE | Status: ACTIVE | COMMUNITY

## 2024-01-19 RX ORDER — SERTRALINE HYDROCHLORIDE 150 MG/1
1 CAPSULE CAPSULE ORAL ONCE A DAY
Status: ACTIVE | COMMUNITY

## 2024-01-19 RX ORDER — OMEPRAZOLE 40 MG/1
1 CAPSULE 30 MINUTES BEFORE MORNING MEAL CAPSULE, DELAYED RELEASE ORAL ONCE A DAY
Qty: 30 | Status: ACTIVE | COMMUNITY
Start: 2023-11-17

## 2024-01-19 RX ORDER — AMITRIPTYLINE HYDROCHLORIDE 10 MG/1
25MG TABLET, FILM COATED ORAL EVERY NIGHT
OUTPATIENT
Start: 2019-02-20

## 2024-01-19 RX ORDER — VEDOLIZUMAB 300 MG/5ML
AS DIRECTED INJECTION, POWDER, LYOPHILIZED, FOR SOLUTION INTRAVENOUS
OUTPATIENT
Start: 2021-07-27

## 2024-01-19 NOTE — HPI-TODAY'S VISIT:
Here for f/u of ileocolonic Crohn's disease and IBS . Hx/o ileocolonic CD dx in 2019 hx/o fatty liver hx/o Fe def anemia . 2/2020 s/p appy and right moises with dramatic improvement in symptoms 6/2021 s/p ccx with resolution of upper abd pain, GB did have stones and adhesions . EGD 2016 Dr. Macias with PUD EGD 3/2019 negative, bx with chronic gastritis and duodenitis 2019 Colonoscopy with TI and colon bx with ileocolonic Crohn's disease 2020 Colon with no active crohn's EGD 2021 with bx neg for zavala's, celiac or h pylori 2022 colon with no active colitis 2023 Colon with bx neg for colitis . 11/2019 CT a/p with active uncomplicated ileitis MRE 8/2020 with no active small bowel inflammation US 4/2021 neg for GB pathology PPIDA 6/2021 with delayed EF 2022 MRE with mild active ileitis 2023 CTE with minimal inflammatory changes . Fecal calpro 9/2020 57 2020 Fecal elastase over 500 5/2021 Fecal calprotectin normal 2022 fecal calpro nl at 31 . TPMT enzyme activity in the normal range TB spot negative Hepatitis B is negative, pt s/p vaccination . SIBO not complete but suggestive of negative S/p xifaxan 2023 with much improvement in symptoms . Prior tx: Failed humira On entyvio level slightly low at 12 Q8 weeks Entyvio at 20 in 5/2021 without antibodies Entyvio 2022 16 without ab . Pt here for f/u. last visit we restarted omeprazole and her upper abd pain has dramatically improved. She feels the omeprazole and bentyl have helped. She is still on the fiber. We increased elavil. She was doing really well until yesterday. She previousliy was zero pain and since yesterday has had near 8/10 pain. I do wonder why she had an acute flare. THis may be an infectious process vs a flare of IBS. We had discussed xifaxan and we can consider that particulary given her IBS sx have not fully resolved.  .

## 2024-01-31 ENCOUNTER — WEB ENCOUNTER (OUTPATIENT)
Dept: URBAN - METROPOLITAN AREA CLINIC 9 | Facility: CLINIC | Age: 39
End: 2024-01-31

## 2024-01-31 RX ORDER — DICYCLOMINE HYDROCHLORIDE 10 MG/1
1 CAPSULE 30 MINUTES BEFORE EATING CAPSULE ORAL
Qty: 120 | Refills: 0

## 2024-04-19 ENCOUNTER — OV EP (OUTPATIENT)
Dept: URBAN - METROPOLITAN AREA CLINIC 9 | Facility: CLINIC | Age: 39
End: 2024-04-19

## 2024-05-07 ENCOUNTER — WEB ENCOUNTER (OUTPATIENT)
Dept: URBAN - METROPOLITAN AREA CLINIC 9 | Facility: CLINIC | Age: 39
End: 2024-05-07

## 2024-05-08 ENCOUNTER — WEB ENCOUNTER (OUTPATIENT)
Dept: URBAN - METROPOLITAN AREA CLINIC 9 | Facility: CLINIC | Age: 39
End: 2024-05-08

## 2024-05-20 ENCOUNTER — WEB ENCOUNTER (OUTPATIENT)
Dept: URBAN - METROPOLITAN AREA CLINIC 9 | Facility: CLINIC | Age: 39
End: 2024-05-20

## 2024-06-07 ENCOUNTER — OFFICE VISIT (OUTPATIENT)
Dept: URBAN - METROPOLITAN AREA CLINIC 9 | Facility: CLINIC | Age: 39
End: 2024-06-07

## 2024-07-23 ENCOUNTER — TELEPHONE ENCOUNTER (OUTPATIENT)
Dept: URBAN - METROPOLITAN AREA CLINIC 9 | Facility: CLINIC | Age: 39
End: 2024-07-23

## 2024-07-23 RX ORDER — DICYCLOMINE HYDROCHLORIDE 10 MG/1
1 CAPSULE 30 MINUTES BEFORE EATING CAPSULE ORAL
Qty: 120 | Refills: 1
End: 2024-09-21

## 2024-08-29 ENCOUNTER — OFFICE VISIT (OUTPATIENT)
Dept: URBAN - METROPOLITAN AREA CLINIC 9 | Facility: CLINIC | Age: 39
End: 2024-08-29
Payer: COMMERCIAL

## 2024-08-29 VITALS
HEIGHT: 64 IN | WEIGHT: 164 LBS | DIASTOLIC BLOOD PRESSURE: 80 MMHG | BODY MASS INDEX: 28 KG/M2 | SYSTOLIC BLOOD PRESSURE: 120 MMHG

## 2024-08-29 DIAGNOSIS — K50.80 CROHN'S DISEASE OF BOTH SMALL AND LARGE INTESTINE WITHOUT COMPLICATION: ICD-10-CM

## 2024-08-29 DIAGNOSIS — K21.9 GERD WITHOUT ESOPHAGITIS: ICD-10-CM

## 2024-08-29 DIAGNOSIS — K58.0 IRRITABLE BOWEL SYNDROME WITH DIARRHEA: ICD-10-CM

## 2024-08-29 PROCEDURE — 99214 OFFICE O/P EST MOD 30 MIN: CPT | Performed by: INTERNAL MEDICINE

## 2024-08-29 RX ORDER — AMITRIPTYLINE HYDROCHLORIDE 25 MG/1
1 TABLET AT BEDTIME TABLET, FILM COATED ORAL EVERY NIGHT
Status: ACTIVE | COMMUNITY
Start: 2019-02-20

## 2024-08-29 RX ORDER — DROSPIRENONE AND ETHINYL ESTRADIOL 0.02-3(28)
KIT ORAL ONCE A DAY
Refills: 0 | Status: ACTIVE | COMMUNITY
Start: 2019-02-20

## 2024-08-29 RX ORDER — VEDOLIZUMAB 300 MG/5ML
AS DIRECTED INJECTION, POWDER, LYOPHILIZED, FOR SOLUTION INTRAVENOUS
OUTPATIENT
Start: 2021-07-27

## 2024-08-29 RX ORDER — DICYCLOMINE HYDROCHLORIDE 10 MG/1
1 CAPSULE 30 MINUTES BEFORE EATING CAPSULE ORAL
OUTPATIENT

## 2024-08-29 RX ORDER — WHEAT DEXTRIN 3 G/3.5 G
AS DIRECTED POWDER (GRAM) ORAL
Status: ACTIVE | COMMUNITY
Start: 2023-11-17

## 2024-08-29 RX ORDER — WHEAT DEXTRIN 3 G/3.5 G
AS DIRECTED POWDER (GRAM) ORAL
OUTPATIENT
Start: 2023-11-17

## 2024-08-29 RX ORDER — OMEPRAZOLE 40 MG/1
1 CAPSULE 30 MINUTES BEFORE MORNING MEAL CAPSULE, DELAYED RELEASE ORAL ONCE A DAY
Qty: 90 | Refills: 2 | Status: ACTIVE | COMMUNITY
Start: 2023-11-17

## 2024-08-29 RX ORDER — OMEPRAZOLE 40 MG/1
1 CAPSULE 30 MINUTES BEFORE MORNING MEAL CAPSULE, DELAYED RELEASE ORAL ONCE A DAY
OUTPATIENT
Start: 2023-11-17

## 2024-08-29 RX ORDER — AMITRIPTYLINE HYDROCHLORIDE 50 MG/1
1 TABLET AT BEDTIME TABLET, FILM COATED ORAL EVERY NIGHT
Qty: 90 | Refills: 3 | OUTPATIENT
Start: 2019-02-20

## 2024-08-29 RX ORDER — ONDANSETRON HYDROCHLORIDE 4 MG/1
1 (ONE) TABLET, FILM COATED ORAL
Qty: 0 | Refills: 5 | Status: ACTIVE | COMMUNITY
Start: 2022-01-28

## 2024-08-29 RX ORDER — CYCLOBENZAPRINE HYDROCHLORIDE 10 MG/1
1 TABLET AT BEDTIME AS NEEDED TABLET, FILM COATED ORAL ONCE A DAY
Status: ACTIVE | COMMUNITY

## 2024-08-29 RX ORDER — VEDOLIZUMAB 300 MG/5ML
AS DIRECTED INJECTION, POWDER, LYOPHILIZED, FOR SOLUTION INTRAVENOUS
Qty: 1 | Refills: 5 | Status: ACTIVE | COMMUNITY
Start: 2021-07-27 | End: 2025-03-02

## 2024-08-29 RX ORDER — DICYCLOMINE HYDROCHLORIDE 10 MG/1
1 CAPSULE 30 MINUTES BEFORE EATING CAPSULE ORAL
Qty: 120 | Refills: 1 | Status: ACTIVE | COMMUNITY
End: 2024-09-21

## 2024-08-29 RX ORDER — SERTRALINE HYDROCHLORIDE 150 MG/1
1 CAPSULE CAPSULE ORAL ONCE A DAY
Status: ACTIVE | COMMUNITY

## 2024-08-29 RX ORDER — NALTREXONE HYDROCHLORIDE AND BUPROPION HYDROCHLORIDE 8; 90 MG/1; MG/1
2 TABLETS TABLET, EXTENDED RELEASE ORAL TWICE A DAY
Status: ON HOLD | COMMUNITY

## 2024-08-29 RX ORDER — ALPRAZOLAM 0.25 MG/1
1 TABLET TABLET ORAL AS NEEDED
Status: ACTIVE | COMMUNITY

## 2024-08-29 NOTE — HPI-TODAY'S VISIT:
Here for f/u of ileocolonic Crohn's disease and IBS . Hx/o ileocolonic CD dx in 2019 hx/o fatty liver hx/o Fe def anemia . 2/2020 s/p appy and right moises with dramatic improvement in symptoms 6/2021 s/p ccx with resolution of upper abd pain, GB did have stones and adhesions . EGD 2016 Dr. Macias with PUD EGD 3/2019 negative, bx with chronic gastritis and duodenitis 2019 Colonoscopy with TI and colon bx with ileocolonic Crohn's disease 2020 Colon with no active crohn's EGD 2021 with bx neg for zavala's, celiac or h pylori 2022 colon with no active colitis 2023 Colon with bx neg for colitis . 11/2019 CT a/p with active uncomplicated ileitis MRE 8/2020 with no active small bowel inflammation US 4/2021 neg for GB pathology PPIDA 6/2021 with delayed EF 2022 MRE with mild active ileitis 2023 CTE with minimal inflammatory changes . Fecal calpro 9/2020 57 2020 Fecal elastase over 500 5/2021 Fecal calprotectin normal 2022 fecal calpro nl at 31 . TPMT enzyme activity in the normal range TB spot negative Hepatitis B is negative, pt s/p vaccination . SIBO not complete but suggestive of negative S/p xifaxan 2023 with much improvement in symptoms . Prior tx: Failed humira On entyvio level slightly low at 12 Q8 weeks Entyvio at 20 in 5/2021 without antibodies Entyvio 2022 16 without ab . Pt here for f/u. She is doing quite well. She is on omeprazole regularly with occ gerd, she is on elavil but will increase to 50mg. Will cont entyvio. RTC 6mo and plan enterography to re-assess bowel inflammation but hoperfully it has improved compared to 2023.  . .

## 2024-09-17 ENCOUNTER — LAB OUTSIDE AN ENCOUNTER (OUTPATIENT)
Dept: URBAN - METROPOLITAN AREA CLINIC 9 | Facility: CLINIC | Age: 39
End: 2024-09-17

## (undated) LAB
ALBUMIN SERUM: (no result)
ALKALINE PHOSPHATASE: (no result)
ALT (SGPT) (ALANINE AMINO TRANSFERASE): (no result)
AST (SGOT)  (ASPART AMINO TRANSFERASE): (no result)
BILIRUBIN, TOTAL: (no result)
BLOOD COUNT, PLATELET, AUTOMATED: (no result)
GRANULOCYTE %: (no result)
HCT (HEMATOCRIT): (no result)
HGB (HEMOGLOBIN): (no result)
LYMPHOCYTES, TOTAL: (no result)
MCH (MEAN CORPUSCULAR HEMOGLOBIN): (no result)
MCHC (MEAN CORPUSCULAR HEMOGLOBIN CONCENTRATI: (no result)
MCV (MEAN CORPUSCULAR VOLUME): (no result)
MONOCYTES: (no result)
RBC: (no result)
WBC: (no result)